# Patient Record
Sex: FEMALE | Race: WHITE | Employment: PART TIME | URBAN - METROPOLITAN AREA
[De-identification: names, ages, dates, MRNs, and addresses within clinical notes are randomized per-mention and may not be internally consistent; named-entity substitution may affect disease eponyms.]

---

## 2021-03-26 ENCOUNTER — OFFICE VISIT (OUTPATIENT)
Dept: FAMILY MEDICINE CLINIC | Age: 26
End: 2021-03-26
Payer: COMMERCIAL

## 2021-03-26 VITALS
HEIGHT: 59 IN | BODY MASS INDEX: 59.07 KG/M2 | WEIGHT: 293 LBS | DIASTOLIC BLOOD PRESSURE: 80 MMHG | OXYGEN SATURATION: 98 % | SYSTOLIC BLOOD PRESSURE: 120 MMHG | HEART RATE: 102 BPM

## 2021-03-26 DIAGNOSIS — H60.502 ACUTE OTITIS EXTERNA OF LEFT EAR, UNSPECIFIED TYPE: Primary | ICD-10-CM

## 2021-03-26 PROCEDURE — G8484 FLU IMMUNIZE NO ADMIN: HCPCS | Performed by: NURSE PRACTITIONER

## 2021-03-26 PROCEDURE — G8417 CALC BMI ABV UP PARAM F/U: HCPCS | Performed by: NURSE PRACTITIONER

## 2021-03-26 PROCEDURE — 4004F PT TOBACCO SCREEN RCVD TLK: CPT | Performed by: NURSE PRACTITIONER

## 2021-03-26 PROCEDURE — 99202 OFFICE O/P NEW SF 15 MIN: CPT | Performed by: NURSE PRACTITIONER

## 2021-03-26 PROCEDURE — 4130F TOPICAL PREP RX AOE: CPT | Performed by: NURSE PRACTITIONER

## 2021-03-26 PROCEDURE — G8427 DOCREV CUR MEDS BY ELIG CLIN: HCPCS | Performed by: NURSE PRACTITIONER

## 2021-03-26 RX ORDER — HYDROXYZINE HYDROCHLORIDE 25 MG/1
25 TABLET, FILM COATED ORAL EVERY 8 HOURS PRN
COMMUNITY
Start: 2021-02-23 | End: 2021-05-24

## 2021-03-26 RX ORDER — SERTRALINE HYDROCHLORIDE 100 MG/1
200 TABLET, FILM COATED ORAL DAILY
COMMUNITY
Start: 2021-02-23 | End: 2021-05-24

## 2021-03-26 RX ORDER — BUPROPION HYDROCHLORIDE 150 MG/1
150 TABLET ORAL DAILY
COMMUNITY
Start: 2021-03-23 | End: 2021-04-22

## 2021-03-26 RX ORDER — NEOMYCIN SULFATE, POLYMYXIN B SULFATE AND HYDROCORTISONE 10; 3.5; 1 MG/ML; MG/ML; [USP'U]/ML
3 SUSPENSION/ DROPS AURICULAR (OTIC) 3 TIMES DAILY
Qty: 1 BOTTLE | Refills: 0 | Status: SHIPPED | OUTPATIENT
Start: 2021-03-26 | End: 2021-04-02

## 2021-03-26 RX ORDER — ARIPIPRAZOLE 5 MG/1
5 TABLET ORAL DAILY
COMMUNITY
Start: 2021-02-23 | End: 2021-05-24

## 2021-03-26 ASSESSMENT — PATIENT HEALTH QUESTIONNAIRE - PHQ9
SUM OF ALL RESPONSES TO PHQ QUESTIONS 1-9: 0
SUM OF ALL RESPONSES TO PHQ9 QUESTIONS 1 & 2: 0
SUM OF ALL RESPONSES TO PHQ QUESTIONS 1-9: 0
2. FEELING DOWN, DEPRESSED OR HOPELESS: 0

## 2021-03-26 ASSESSMENT — ENCOUNTER SYMPTOMS
ABDOMINAL PAIN: 0
SHORTNESS OF BREATH: 0
SORE THROAT: 1
COUGH: 0
RHINORRHEA: 0

## 2021-03-26 NOTE — PATIENT INSTRUCTIONS
Water exclusion from the left ear for one week -> do not submerge your head under water, limit use of in-ear headphones or ear plugs  Ok to use over the counter acetaminophen or ibuprofen according to dose instruction on label as needed for pain  Warm compress applied to external ear may be helpful    To Use the Ear Drops:  Lie down on your side with infected ear facing up. Carefully drip the correct number of eardrops into your ear. Have another person help you if needed. Gently move the outside part of your ear back and forth to help the medicine reach your ear canal.  Stay lying down in the same position (with your ear facing up) for 3 to 5 minutes. Return to office or see PCP if new or worsening symptoms in the next 3 days     Patient Education   Swimmer's Ear: Care Instructions  Your Care Instructions     Swimmer's ear (otitis externa) is inflammation or infection of the ear canal. This is the passage that leads from the outer ear to the eardrum. Any water, sand, or other debris that gets into the ear canal and stays there can cause swimmer's ear. Putting cotton swabs or other items in the ear to clean it can also cause this problem. Swimmer's ear can be very painful. But you can treat the pain and infection with medicines. You should feel better in a few days. Follow-up care is a key part of your treatment and safety. Be sure to make and go to all appointments, and call your doctor if you are having problems. It's also a good idea to know your test results and keep a list of the medicines you take. How can you care for yourself at home? Cleaning and care  · Use antibiotic drops as your doctor directs. · Do not insert ear drops (other than the antibiotic ear drops) or anything else into the ear unless your doctor has told you to. · Avoid getting water in the ear until the problem clears up. Use cotton lightly coated with petroleum jelly as an earplug. Do not use plastic earplugs.   · Use a hair dryer set on low to carefully dry the ear after you shower. · To ease ear pain, hold a warm washcloth against your ear. · Take pain medicines exactly as directed. ? If the doctor gave you a prescription medicine for pain, take it as prescribed. ? If you are not taking a prescription pain medicine, ask your doctor if you can take an over-the-counter medicine. Inserting ear drops  · Warm the drops to body temperature by rolling the container in your hands. Or you can place it in a cup of warm water for a few minutes. · Lie down, with your ear facing up. · Place drops inside the ear. Follow your doctor's instructions (or the directions on the label) for how many drops to use. Gently wiggle the outer ear or pull the ear up and back to help the drops get into the ear. · It's important to keep the liquid in the ear canal for 3 to 5 minutes. When should you call for help? Call your doctor now or seek immediate medical care if:    · You have a new or higher fever.     · You have new or worse pain, swelling, warmth, or redness around or behind your ear.     · You have new or increasing pus or blood draining from your ear. Watch closely for changes in your health, and be sure to contact your doctor if:    · You are not getting better after 2 days (48 hours). Where can you learn more? Go to https://[employer] System[employer].Innohub. org and sign in to your Or[employer]It account. Enter C706 in the [facility] box to learn more about \"Swimmer's Ear: Care Instructions. \"     If you do not have an account, please click on the \"Sign Up Now\" link. Current as of: December 2, 2020               Content Version: 12.8  © 7211-5171 Healthwise, Incorporated. Care instructions adapted under license by [facility] ([facility]). If you have questions about a medical condition or this instruction, always ask your healthcare professional. [address] any warranty or liability for your use of this information.

## 2021-03-26 NOTE — PROGRESS NOTES
3/26/2021    SUBJECTIVE:     Paige Pisano, 22 y.o. female presents today with:  Chief Complaint   Patient presents with    Other     pain in her left ear x 1 day     Otalgia   There is pain in the left ear. This is a new problem. The current episode started yesterday. The problem has been gradually worsening. There has been no fever. The pain is moderate. Associated symptoms include a sore throat (L side). Pertinent negatives include no abdominal pain, coughing, ear discharge, headaches, hearing loss, neck pain, rhinorrhea or vomiting. Associated symptoms comments: denies self-instrumentation trauma or recent swimming. She has tried NSAIDs for the symptoms. The treatment provided mild relief. There is no history of a chronic ear infection or a tympanostomy tube. No past medical history on file. No past surgical history on file. Social History     Tobacco Use    Smoking status: Never Smoker    Smokeless tobacco: Current User   Substance Use Topics    Alcohol use: Yes     Frequency: 2-4 times a month     Drinks per session: 1 or 2     Binge frequency: Never    Drug use: Never     Review of Systems   Constitutional: Negative for chills and fever. HENT: Positive for ear pain and sore throat (L side). Negative for ear discharge, hearing loss, rhinorrhea and tinnitus. Respiratory: Negative for cough and shortness of breath. Gastrointestinal: Negative for abdominal pain, nausea and vomiting. Musculoskeletal: Negative for myalgias and neck pain. Neurological: Negative for dizziness and headaches. OBJECTIVE:     Vitals:    03/26/21 0954   BP: 120/80   Site: Right Upper Arm   Position: Sitting   Cuff Size: Large Adult   Pulse: 102   SpO2: 98%   Weight: 296 lb (134.3 kg)   Height: 4' 11\" (1.499 m)     Physical Exam  Vitals signs reviewed. Constitutional:       General: She is not in acute distress. Appearance: She is well-groomed. HENT:      Head: Normocephalic and atraumatic.       Jaw: There is normal jaw occlusion. Right Ear: Tympanic membrane, ear canal and external ear normal. No swelling or tenderness. No mastoid tenderness. Tympanic membrane is not erythematous. Left Ear: External ear normal. Tenderness (+ EAC inflammation) present. No foreign body. No mastoid tenderness. Tympanic membrane is not injected, erythematous or bulging. Nose: Mucosal edema and congestion present. No nasal tenderness. Right Sinus: No maxillary sinus tenderness. Left Sinus: No maxillary sinus tenderness. Mouth/Throat:      Lips: Pink. No lesions. Mouth: Mucous membranes are moist. No oral lesions. Pharynx: Oropharynx is clear. Uvula midline. No pharyngeal swelling or oropharyngeal exudate. Eyes:      General: Lids are normal.      Extraocular Movements: Extraocular movements intact. Conjunctiva/sclera: Conjunctivae normal.      Pupils: Pupils are equal, round, and reactive to light. Comments: + corrective lenses   Neck:      Musculoskeletal: Normal range of motion and neck supple. Vascular: No JVD. Trachea: Trachea and phonation normal.   Cardiovascular:      Rate and Rhythm: Normal rate and regular rhythm. Heart sounds: No murmur. No friction rub. Pulmonary:      Effort: Pulmonary effort is normal. No tachypnea. Breath sounds: Normal breath sounds and air entry. No wheezing. Lymphadenopathy:      Cervical: No cervical adenopathy. Skin:     General: Skin is warm and dry. Capillary Refill: Capillary refill takes less than 2 seconds. Neurological:      General: No focal deficit present. Mental Status: She is alert. Mental status is at baseline. Gait: Gait is intact. Psychiatric:         Mood and Affect: Mood and affect normal.         Behavior: Behavior normal. Behavior is cooperative. POC Testing Today: No results found for this visit on 03/26/21. ASSESSMENT & PLAN:   22 y.o. female with left otalgia for one day. Exam findings consistent with otitis externa. Anti-infective ear drops prescribed. Reviewed supportive care measures and symptoms that are suggestive of worsening, in which case patient will need to follow-up with her PCP or return to [facility]. Diagnoses and all orders for this visit:    ICD-10-CM    1. Acute otitis externa of left ear, unspecified type  H60.502 neomycin-polymyxin-hydrocortisone (CORTISPORIN) 3.5-67912-4 otic suspension     Orders Placed This Encounter   Medications    neomycin-polymyxin-hydrocortisone (CORTISPORIN) 3.5-65282-7 otic suspension     Sig: Place 3 drops into the left ear 3 times daily for 7 days     Dispense:  1 Bottle     Refill:  0     Symptomatic therapy suggested: use acetaminophen, ibuprofen prn  Warm compresses to external ear prn  Instructed to keep ear dry until symptoms are resolved    Return for follow-up and/or see PCP if new or worsening symptoms in the next 3 days . Side effects and adverse effects of any medication prescribed today, as well as treatment plan/rationale, follow-up care, and result expectations have been discussed with the patient. Saul Sy expresses understanding and wishes to proceed with the plan. Discussed signs and symptoms which require immediate follow-up in ED/call to 911. Understanding verbalized. I have reviewed and updated the electronic medical record.     Papi De León, NICK - CNP

## 2021-03-28 ENCOUNTER — HOSPITAL ENCOUNTER (EMERGENCY)
Age: 26
Discharge: HOME | End: 2021-03-28
Attending: EMERGENCY MEDICINE
Payer: COMMERCIAL

## 2021-03-28 VITALS
BODY MASS INDEX: 59.78 KG/M2 | TEMPERATURE: 97.4 F | WEIGHT: 293 LBS | SYSTOLIC BLOOD PRESSURE: 171 MMHG | OXYGEN SATURATION: 98 % | DIASTOLIC BLOOD PRESSURE: 94 MMHG | HEART RATE: 96 BPM | RESPIRATION RATE: 16 BRPM

## 2021-03-28 DIAGNOSIS — H66.002 ACUTE SUPPURATIVE OTITIS MEDIA OF LEFT EAR WITHOUT SPONTANEOUS RUPTURE OF TYMPANIC MEMBRANE, RECURRENCE NOT SPECIFIED: ICD-10-CM

## 2021-03-28 DIAGNOSIS — H60.392 INFECTIVE OTITIS EXTERNA OF LEFT EAR: Primary | ICD-10-CM

## 2021-03-28 PROCEDURE — 99283 EMERGENCY DEPT VISIT LOW MDM: CPT

## 2021-03-28 PROCEDURE — 6370000000 HC RX 637 (ALT 250 FOR IP): Performed by: EMERGENCY MEDICINE

## 2021-03-28 RX ORDER — ACETAMINOPHEN 500 MG
1000 TABLET ORAL ONCE
Status: COMPLETED | OUTPATIENT
Start: 2021-03-28 | End: 2021-03-28

## 2021-03-28 RX ORDER — TRAMADOL HYDROCHLORIDE 50 MG/1
50 TABLET ORAL EVERY 4 HOURS PRN
Qty: 18 TABLET | Refills: 0 | Status: SHIPPED | OUTPATIENT
Start: 2021-03-28 | End: 2021-03-31

## 2021-03-28 RX ORDER — TRAMADOL HYDROCHLORIDE 50 MG/1
50 TABLET ORAL ONCE
Status: COMPLETED | OUTPATIENT
Start: 2021-03-28 | End: 2021-03-28

## 2021-03-28 RX ORDER — AMOXICILLIN AND CLAVULANATE POTASSIUM 875; 125 MG/1; MG/1
1 TABLET, FILM COATED ORAL ONCE
Status: COMPLETED | OUTPATIENT
Start: 2021-03-28 | End: 2021-03-28

## 2021-03-28 RX ORDER — AMOXICILLIN AND CLAVULANATE POTASSIUM 875; 125 MG/1; MG/1
1 TABLET, FILM COATED ORAL 2 TIMES DAILY
Qty: 20 TABLET | Refills: 0 | Status: SHIPPED | OUTPATIENT
Start: 2021-03-28 | End: 2021-04-07

## 2021-03-28 RX ADMIN — TRAMADOL HYDROCHLORIDE 50 MG: 50 TABLET, FILM COATED ORAL at 17:43

## 2021-03-28 RX ADMIN — AMOXICILLIN AND CLAVULANATE POTASSIUM 1 TABLET: 875; 125 TABLET, FILM COATED ORAL at 17:43

## 2021-03-28 RX ADMIN — ACETAMINOPHEN 1000 MG: 500 TABLET ORAL at 17:43

## 2021-03-28 ASSESSMENT — ENCOUNTER SYMPTOMS
VOMITING: 0
COUGH: 0
SHORTNESS OF BREATH: 0
DIARRHEA: 0
BACK PAIN: 0
NAUSEA: 0
SORE THROAT: 0
ABDOMINAL PAIN: 0

## 2021-03-28 ASSESSMENT — PAIN DESCRIPTION - ORIENTATION: ORIENTATION: LEFT

## 2021-03-28 ASSESSMENT — PAIN SCALES - GENERAL: PAINLEVEL_OUTOF10: 3

## 2021-03-28 NOTE — ED PROVIDER NOTES
[facility] ED  eMERGENCYdEPARTMENT eNCOUnter      Pt Name: Juanpablo Jhaveri  MRN: [id number]  [place] 1995  Date of evaluation: 3/28/2021  Slime Mejía MD    CHIEF COMPLAINT           HPI  Juanpablo Jhaveri is a 22 y.o. female per chart review has no pmh presents to the ED with L ear pain. Pt notes gradual onset, moderate, intermittent, throbbing, L ear pain since Friday. Pt went to urgent care Friday and was diagnosed with otitis externa and given ear drops that have not helped. Pt denies fever, smoking, swimming, DM II, cp, sob, dysuria, diarrhea. ROS  Review of Systems   Constitutional: Negative for activity change, chills and fever. HENT: Positive for ear pain. Negative for sore throat. Eyes: Negative for visual disturbance. Respiratory: Negative for cough and shortness of breath. Cardiovascular: Negative for chest pain, palpitations and leg swelling. Gastrointestinal: Negative for abdominal pain, diarrhea, nausea and vomiting. Genitourinary: Negative for dysuria. Musculoskeletal: Negative for back pain. Skin: Negative for rash. Neurological: Negative for dizziness and weakness. Except as noted above the remainder of the review of systems was reviewed and negative. PAST MEDICAL HISTORY   History reviewed. No pertinent past medical history. SURGICAL HISTORY     History reviewed. No pertinent surgical history.       CURRENTMEDICATIONS       Previous Medications    ARIPIPRAZOLE (ABILIFY) 5 MG TABLET    Take 5 mg by mouth daily    BUPROPION (WELLBUTRIN XL) 150 MG EXTENDED RELEASE TABLET    Take 150 mg by mouth daily    HYDROXYZINE (ATARAX) 25 MG TABLET    Take 25 mg by mouth every 8 hours as needed    NEOMYCIN-POLYMYXIN-HYDROCORTISONE (CORTISPORIN) 3.5-44926-4 OTIC SUSPENSION    Place 3 drops into the left ear 3 times daily for 7 days    SERTRALINE (ZOLOFT) 100 MG TABLET    Take 200 mg by mouth daily       ALLERGIES Oxycodone-acetaminophen    FAMILY HISTORY     History reviewed. No pertinent family history. SOCIAL HISTORY       Social History     Socioeconomic History    Marital status: Single     Spouse name: None    Number of children: None    Years of education: None    Highest education level: None   Occupational History    None   Social Needs    Financial resource strain: None    Food insecurity     Worry: None     Inability: None    Transportation needs     Medical: None     Non-medical: None   Tobacco Use    Smoking status: Never Smoker    Smokeless tobacco: Current User   Substance and Sexual Activity    Alcohol use: Yes     Frequency: 2-4 times a month     Drinks per session: 1 or 2     Binge frequency: Never    Drug use: Never    Sexual activity: None   Lifestyle    Physical activity     Days per week: None     Minutes per session: None    Stress: None   Relationships    Social connections     Talks on phone: None     Gets together: None     Attends Moravian service: None     Active member of club or organization: None     Attends meetings of clubs or organizations: None     Relationship status: None    Intimate partner violence     Fear of current or ex partner: None     Emotionally abused: None     Physically abused: None     Forced sexual activity: None   Other Topics Concern    None   Social History Narrative    None         PHYSICAL EXAM       ED Triage Vitals [03/28/21 1737]   BP Temp Temp Source Pulse Resp SpO2 Height Weight   (!) 171/94 97.4 °F (36.3 °C) Oral 96 16 98 % -- 296 lb (134.3 kg)       Physical Exam  Vitals signs and nursing note reviewed. Constitutional:       Appearance: She is well-developed. HENT:      Head: Normocephalic. Right Ear: External ear normal.      Left Ear: External ear normal.      Ears:      Comments: R TM ear canal unremarkable. R TM opaque, good landmarks. L TM opaque, erythematic, poor landmarks.   +Purulence in ear canal.   Eyes: Conjunctiva/sclera: Conjunctivae normal.      Pupils: Pupils are equal, round, and reactive to light. Neck:      Musculoskeletal: Normal range of motion and neck supple. Cardiovascular:      Rate and Rhythm: Normal rate and regular rhythm. Heart sounds: Normal heart sounds. Pulmonary:      Effort: Pulmonary effort is normal.      Breath sounds: Normal breath sounds. Abdominal:      General: Bowel sounds are normal. There is no distension. Palpations: Abdomen is soft. Tenderness: There is no abdominal tenderness. Musculoskeletal: Normal range of motion. Skin:     General: Skin is warm and dry. Neurological:      Mental Status: She is alert and oriented to person, place, and time. Psychiatric:         Mood and Affect: Mood normal.           MDM  21 yo female presents to the ED with ear pain. Pt is afebrile, hemodynamically stable. Pt clinically with otitis externa and otitis media. Unsure of etiology of otitis externa given no smoking, DM II, swimming. Given that drops have not helped resolve pt's infx, will add oral abx. Pt given PO tramadol, PO tylenol in the ED. Pt also given PO augmentin in the ED. Pt given prescription for tramadol, augmentin. Pt will f/u with pcp. Pt understands plan. FINAL IMPRESSION      1. Infective otitis externa of left ear    2.  Acute suppurative otitis media of left ear without spontaneous rupture of tympanic membrane, recurrence not specified          DISPOSITION/PLAN   DISPOSITION Decision To Discharge 03/28/2021 05:44:46 PM        DISCHARGE MEDICATIONS:  [unfilled]         Brando Stafford MD(electronically signed)  Attending Emergency Physician            Brando Stafford MD  03/28/21 0080

## 2021-04-16 ASSESSMENT — ENCOUNTER SYMPTOMS
NAUSEA: 0
VOMITING: 0

## 2021-12-19 ENCOUNTER — HOSPITAL ENCOUNTER (EMERGENCY)
Age: 26
Discharge: HOME | End: 2021-12-20
Attending: EMERGENCY MEDICINE
Payer: COMMERCIAL

## 2021-12-19 ENCOUNTER — APPOINTMENT (OUTPATIENT)
Dept: CT IMAGING | Age: 26
End: 2021-12-19
Payer: COMMERCIAL

## 2021-12-19 DIAGNOSIS — N12 PYELONEPHRITIS: ICD-10-CM

## 2021-12-19 DIAGNOSIS — N30.01 ACUTE CYSTITIS WITH HEMATURIA: Primary | ICD-10-CM

## 2021-12-19 LAB
ALBUMIN SERPL-MCNC: 4.5 G/DL (ref 3.5–4.6)
ALP BLD-CCNC: 59 U/L (ref 40–130)
ALT SERPL-CCNC: 20 U/L (ref 0–33)
ANION GAP SERPL CALCULATED.3IONS-SCNC: 16 MEQ/L (ref 9–15)
AST SERPL-CCNC: 18 U/L (ref 0–35)
BACTERIA: NEGATIVE /HPF
BASOPHILS ABSOLUTE: 0.1 K/UL (ref 0–0.2)
BASOPHILS RELATIVE PERCENT: 0.6 %
BILIRUB SERPL-MCNC: 0.5 MG/DL (ref 0.2–0.7)
BILIRUBIN URINE: NEGATIVE
BLOOD, URINE: ABNORMAL
BUN BLDV-MCNC: 20 MG/DL (ref 6–20)
CALCIUM SERPL-MCNC: 9.6 MG/DL (ref 8.5–9.9)
CHLORIDE BLD-SCNC: 100 MEQ/L (ref 95–107)
CLARITY: ABNORMAL
CO2: 22 MEQ/L (ref 20–31)
COLOR: YELLOW
CREAT SERPL-MCNC: 1.08 MG/DL (ref 0.5–0.9)
EOSINOPHILS ABSOLUTE: 0.1 K/UL (ref 0–0.7)
EOSINOPHILS RELATIVE PERCENT: 1.6 %
EPITHELIAL CELLS, UA: ABNORMAL /HPF (ref 0–5)
GFR AFRICAN AMERICAN: >60
GFR NON-AFRICAN AMERICAN: >60
GLOBULIN: 3.2 G/DL (ref 2.3–3.5)
GLUCOSE BLD-MCNC: 114 MG/DL (ref 70–99)
GLUCOSE URINE: NEGATIVE MG/DL
HCG, URINE, POC: NEGATIVE
HCT VFR BLD CALC: 44.3 % (ref 37–47)
HEMOGLOBIN: 14.9 G/DL (ref 12–16)
HYALINE CASTS: ABNORMAL /HPF (ref 0–5)
KETONES, URINE: ABNORMAL MG/DL
LEUKOCYTE ESTERASE, URINE: ABNORMAL
LYMPHOCYTES ABSOLUTE: 1.6 K/UL (ref 1–4.8)
LYMPHOCYTES RELATIVE PERCENT: 18.9 %
Lab: NORMAL
MCH RBC QN AUTO: 29.2 PG (ref 27–31.3)
MCHC RBC AUTO-ENTMCNC: 33.6 % (ref 33–37)
MCV RBC AUTO: 86.8 FL (ref 82–100)
MONOCYTES ABSOLUTE: 0.8 K/UL (ref 0.2–0.8)
MONOCYTES RELATIVE PERCENT: 8.7 %
NEGATIVE QC PASS/FAIL: NORMAL
NEUTROPHILS ABSOLUTE: 6.1 K/UL (ref 1.4–6.5)
NEUTROPHILS RELATIVE PERCENT: 70.2 %
NITRITE, URINE: NEGATIVE
PDW BLD-RTO: 12.9 % (ref 11.5–14.5)
PH UA: 5 (ref 5–9)
PLATELET # BLD: 350 K/UL (ref 130–400)
POSITIVE QC PASS/FAIL: NORMAL
POTASSIUM SERPL-SCNC: 3.9 MEQ/L (ref 3.4–4.9)
PROTEIN UA: 30 MG/DL
RBC # BLD: 5.1 M/UL (ref 4.2–5.4)
RBC UA: ABNORMAL /HPF (ref 0–5)
SODIUM BLD-SCNC: 138 MEQ/L (ref 135–144)
SPECIFIC GRAVITY UA: 1.02 (ref 1–1.03)
TOTAL PROTEIN: 7.7 G/DL (ref 6.3–8)
URINE REFLEX TO CULTURE: ABNORMAL
UROBILINOGEN, URINE: 0.2 E.U./DL
WBC # BLD: 8.7 K/UL (ref 4.8–10.8)
WBC UA: ABNORMAL /HPF (ref 0–5)

## 2021-12-19 PROCEDURE — 96374 THER/PROPH/DIAG INJ IV PUSH: CPT

## 2021-12-19 PROCEDURE — 6360000002 HC RX W HCPCS: Performed by: EMERGENCY MEDICINE

## 2021-12-19 PROCEDURE — 80053 COMPREHEN METABOLIC PANEL: CPT

## 2021-12-19 PROCEDURE — 99283 EMERGENCY DEPT VISIT LOW MDM: CPT

## 2021-12-19 PROCEDURE — 85025 COMPLETE CBC W/AUTO DIFF WBC: CPT

## 2021-12-19 PROCEDURE — 36415 COLL VENOUS BLD VENIPUNCTURE: CPT

## 2021-12-19 PROCEDURE — 74176 CT ABD & PELVIS W/O CONTRAST: CPT

## 2021-12-19 PROCEDURE — 81001 URINALYSIS AUTO W/SCOPE: CPT

## 2021-12-19 RX ORDER — KETOROLAC TROMETHAMINE 15 MG/ML
30 INJECTION, SOLUTION INTRAMUSCULAR; INTRAVENOUS ONCE
Status: COMPLETED | OUTPATIENT
Start: 2021-12-19 | End: 2021-12-19

## 2021-12-19 RX ADMIN — KETOROLAC TROMETHAMINE 30 MG: 15 INJECTION, SOLUTION INTRAMUSCULAR; INTRAVENOUS at 23:25

## 2021-12-19 ASSESSMENT — ENCOUNTER SYMPTOMS
ABDOMINAL PAIN: 1
NAUSEA: 1
VOMITING: 1

## 2021-12-19 ASSESSMENT — PAIN SCALES - GENERAL
PAINLEVEL_OUTOF10: 4
PAINLEVEL_OUTOF10: 6

## 2021-12-19 ASSESSMENT — PAIN DESCRIPTION - ORIENTATION: ORIENTATION: RIGHT;LOWER

## 2021-12-19 ASSESSMENT — PAIN DESCRIPTION - LOCATION: LOCATION: ABDOMEN

## 2021-12-19 ASSESSMENT — PAIN DESCRIPTION - DESCRIPTORS: DESCRIPTORS: STABBING

## 2021-12-19 ASSESSMENT — PAIN DESCRIPTION - PAIN TYPE: TYPE: ACUTE PAIN

## 2021-12-20 VITALS
TEMPERATURE: 98 F | DIASTOLIC BLOOD PRESSURE: 78 MMHG | BODY MASS INDEX: 59.07 KG/M2 | OXYGEN SATURATION: 98 % | HEIGHT: 59 IN | WEIGHT: 293 LBS | SYSTOLIC BLOOD PRESSURE: 135 MMHG | RESPIRATION RATE: 20 BRPM | HEART RATE: 99 BPM

## 2021-12-20 PROCEDURE — 6370000000 HC RX 637 (ALT 250 FOR IP): Performed by: EMERGENCY MEDICINE

## 2021-12-20 RX ORDER — SULFAMETHOXAZOLE AND TRIMETHOPRIM 800; 160 MG/1; MG/1
1 TABLET ORAL 2 TIMES DAILY
Qty: 28 TABLET | Refills: 0 | Status: SHIPPED | OUTPATIENT
Start: 2021-12-20 | End: 2022-01-03

## 2021-12-20 RX ORDER — SULFAMETHOXAZOLE AND TRIMETHOPRIM 800; 160 MG/1; MG/1
1 TABLET ORAL ONCE
Status: COMPLETED | OUTPATIENT
Start: 2021-12-20 | End: 2021-12-20

## 2021-12-20 RX ADMIN — SULFAMETHOXAZOLE AND TRIMETHOPRIM 1 TABLET: 800; 160 TABLET ORAL at 00:42

## 2021-12-20 ASSESSMENT — PAIN DESCRIPTION - PROGRESSION: CLINICAL_PROGRESSION: GRADUALLY IMPROVING

## 2021-12-20 NOTE — ED PROVIDER NOTES
[facility] ED  EMERGENCY MEDICINE     Pt Name: Seven Sanchez  MRN: [id number]  [place] 1995  Date of evaluation: 12/19/2021  PCP:    Nevin Erwin  Provider: Bony Hall, [address] [place] [address]       Chief Complaint   Patient presents with    Abdominal Pain     right sided lower abd pain        HISTORY OF PRESENT ILLNESS    HPI     70-year-old female with history of PCOS and past cholecystectomy presents to the emergency department with complaint of right lower abdominal pain radiating to the back. No history of kidney stones. No dysuria or hematuria. Denies fevers or chills. Did take 200 mg of Motrin before coming in at around 6 PM with minimal relief. States her pain is a 5 out of 10 at this time. States that she did have nausea and one episode of vomiting. Triage notes and Nursing notes were reviewed by myself. Any discrepancies are addressed above. PAST MEDICAL HISTORY     Past Medical History:   Diagnosis Date    PCOS (polycystic ovarian syndrome)        SURGICAL HISTORY       Past Surgical History:   Procedure Laterality Date    CHOLECYSTECTOMY         CURRENT MEDICATIONS       Previous Medications    ARIPIPRAZOLE (ABILIFY) 5 MG TABLET    Take 5 mg by mouth daily    BUPROPION (WELLBUTRIN XL) 150 MG EXTENDED RELEASE TABLET    Take 150 mg by mouth daily    SERTRALINE (ZOLOFT) 100 MG TABLET    Take 200 mg by mouth daily       ALLERGIES       Allergies   Allergen Reactions    Oxycodone-Acetaminophen Shortness Of Breath     Visual changes and nausea       FAMILY HISTORY     History reviewed. No pertinent family history.      SOCIAL HISTORY       Social History     Socioeconomic History    Marital status: Single     Spouse name: None    Number of children: None    Years of education: None    Highest education level: None   Occupational History    None   Tobacco Use    Smoking status: Never Smoker    Smokeless tobacco: Current User   Substance and Sexual Activity  Alcohol use: Yes     Comment: socially    Drug use: Never    Sexual activity: None   Other Topics Concern    None   Social History Narrative    None     Social Determinants of Health     Financial Resource Strain:     Difficulty of Paying Living Expenses: Not on file   Food Insecurity:     Worried About Running Out of Food in the Last Year: Not on file    [place] of Food in the Last Year: Not on file   Transportation Needs:     Lack of Transportation (Medical): Not on file    Lack of Transportation (Non-Medical): Not on file   Physical Activity:     Days of Exercise per Week: Not on file    Minutes of Exercise per Session: Not on file   Stress:     Feeling of Stress : Not on file   Social Connections:     Frequency of Communication with Friends and Family: Not on file    Frequency of Social Gatherings with Friends and Family: Not on file    Attends Buddhist Services: Not on file    Active Member of [address] [place] [address] [employer] or Organizations: Not on file    Attends Club or Organization Meetings: Not on file    Marital Status: Not on file   Intimate Partner Violence:     Fear of Current or Ex-Partner: Not on file    Emotionally Abused: Not on file    Physically Abused: Not on file    Sexually Abused: Not on file   Housing Stability:     Unable to Pay for Housing in the Last Year: Not on file    Number of [place] in the Last Year: Not on file    Unstable Housing in the Last Year: Not on file       REVIEW OF SYSTEMS     Review of Systems   Gastrointestinal: Positive for abdominal pain, nausea and vomiting. Genitourinary: Positive for flank pain. Except as noted above the remainder of the review of systems was reviewed and is negative.   SCREENINGS                        PHYSICAL EXAM    (up to 7 for level 4, 8 or more for level 5)     ED Triage Vitals [12/19/21 2136]   BP Temp Temp Source Pulse Resp SpO2 Height Weight   (!) 193/108 98 °F (36.7 °C) Oral 109 20 96 % 4' 11\" (1.499 m) 300 lb (136.1 kg) Physical Exam  Constitutional:       General: She is not in acute distress. Appearance: Normal appearance. She is obese. HENT:      Right Ear: External ear normal.      Left Ear: External ear normal.      Nose: Nose normal. No congestion or rhinorrhea. Mouth/Throat:      Mouth: Mucous membranes are moist.      Pharynx: Oropharynx is clear. Eyes:      General:         Right eye: No discharge. Left eye: No discharge. Conjunctiva/sclera: Conjunctivae normal.      Pupils: Pupils are equal, round, and reactive to light. Cardiovascular:      Rate and Rhythm: Normal rate and regular rhythm. Pulmonary:      Effort: Pulmonary effort is normal. No respiratory distress. Abdominal:      General: Abdomen is flat. There is no distension. Tenderness: There is no abdominal tenderness (No reproducible tenderness on exam). Musculoskeletal:         General: Normal range of motion. Cervical back: Normal range of motion. Skin:     General: Skin is warm and dry. Capillary Refill: Capillary refill takes less than 2 seconds. Neurological:      General: No focal deficit present. Mental Status: She is alert. DIAGNOSTIC RESULTS     EKG:(none if blank)  All EKGs are interpreted by the Emergency Department Physician who either signs or Co-signs this chart in the absence of a cardiologist.        RADIOLOGY: (none if blank)   I directly visualized the following images and reviewed the radiologist interpretations. Interpretation per the Radiologist below, if available at the time of this note:  CT ABDOMEN PELVIS WO CONTRAST Additional Contrast? None   Final Result     There is mild right hydronephrosis without hydroureter or visible    ureteral calculus. Consider ascending urinary tract infection or    pyelonephritis. Unremarkable bowel gas pattern.   No acute inflammatory process is seen    involving the bowel          LABS:  Labs Reviewed   COMPREHENSIVE METABOLIC PANEL - Abnormal; Notable for the following components:       Result Value    Anion Gap 16 (*)     Glucose 114 (*)     CREATININE 1.08 (*)     All other components within normal limits   URINE RT REFLEX TO CULTURE - Abnormal; Notable for the following components:    Clarity, UA TURBID (*)     Ketones, Urine TRACE (*)     Blood, Urine LARGE (*)     Protein, UA 30 (*)     Leukocyte Esterase, Urine SMALL (*)     All other components within normal limits   MICROSCOPIC URINALYSIS - Abnormal; Notable for the following components:    WBC, UA 6-9 (*)     RBC, UA 20-50 (*)     All other components within normal limits   POC PREGNANCY UR-QUAL - Normal   CBC WITH AUTO DIFFERENTIAL       All other labs were within normal range or not returned as of this dictation. Please note, any cultures that may have been sent were not resulted at the time of this patient visit. EMERGENCY DEPARTMENT COURSE and Medical Decision Making:     Vitals:    Vitals:    12/19/21 2136   BP: (!) 193/108   Pulse: 109   Resp: 20   Temp: 98 °F (36.7 °C)   TempSrc: Oral   SpO2: 96%   Weight: 300 lb (136.1 kg)   Height: 4' 11\" (1.499 m)       PROCEDURES: (None if blank)  Procedures       MDM     Patient does have evidence for urinary tract infection and possible pyelonephritis on CT scan. Patient's vitals are otherwise stable and she is afebrile. Kidneys function is normal.  Will be given a dose of Bactrim before discharge. Toradol did help with her pain significantly. Strict return precautions and follow up instructions were discussed with the patient with which the patient agrees    ED Medications administered this visit:    Medications   sulfamethoxazole-trimethoprim (BACTRIM DS;SEPTRA DS) 800-160 MG per tablet 1 tablet (has no administration in time range)   ketorolac (TORADOL) injection 30 mg (30 mg IntraVENous Given 12/19/21 0595)         FINAL IMPRESSION      1. Acute cystitis with hematuria    2.  Pyelonephritis          DISPOSITION/PLAN DISPOSITION Decision To Discharge 12/20/2021 12:09:19 AM      PATIENT REFERRED TO:  Sheri Marker  [address] [id number] [phone]            DISCHARGE MEDICATIONS:  New Prescriptions    SULFAMETHOXAZOLE-TRIMETHOPRIM (BACTRIM DS;SEPTRA DS) 800-160 MG PER TABLET    Take 1 tablet by mouth 2 times daily for 14 days              Suni Lopes DO (electronically signed)  Attending Physician, Emergency Department          Suni Lopes DO  12/20/21 0013

## 2021-12-20 NOTE — ED TRIAGE NOTES
Patient presents to the er with complaints of lower right abdominal pain that goes around to lower back   Denies UTI symptoms  Denies history of kidney stones but states she has family history of   Complains of nausea without emesis   Denies fever

## 2023-07-13 ENCOUNTER — APPOINTMENT (OUTPATIENT)
Dept: GENERAL RADIOLOGY | Age: 28
End: 2023-07-13
Payer: COMMERCIAL

## 2023-07-13 ENCOUNTER — HOSPITAL ENCOUNTER (EMERGENCY)
Age: 28
Discharge: HOME | End: 2023-07-13
Payer: COMMERCIAL

## 2023-07-13 VITALS
RESPIRATION RATE: 16 BRPM | TEMPERATURE: 97.9 F | DIASTOLIC BLOOD PRESSURE: 66 MMHG | WEIGHT: 293 LBS | BODY MASS INDEX: 63.21 KG/M2 | HEART RATE: 84 BPM | SYSTOLIC BLOOD PRESSURE: 91 MMHG | OXYGEN SATURATION: 95 % | HEIGHT: 57 IN

## 2023-07-13 DIAGNOSIS — R07.89 CHEST WALL PAIN: Primary | ICD-10-CM

## 2023-07-13 PROCEDURE — 71046 X-RAY EXAM CHEST 2 VIEWS: CPT

## 2023-07-13 PROCEDURE — 99284 EMERGENCY DEPT VISIT MOD MDM: CPT

## 2023-07-13 PROCEDURE — 93005 ELECTROCARDIOGRAM TRACING: CPT | Performed by: PHYSICIAN ASSISTANT

## 2023-07-13 RX ORDER — ATORVASTATIN CALCIUM 10 MG/1
TABLET, FILM COATED ORAL
COMMUNITY
Start: 2022-07-06

## 2023-07-13 RX ORDER — HYDROXYZINE HYDROCHLORIDE 10 MG/1
TABLET, FILM COATED ORAL
COMMUNITY

## 2023-07-13 RX ORDER — NAPROXEN 500 MG/1
500 TABLET ORAL 2 TIMES DAILY
Qty: 10 TABLET | Refills: 0 | Status: SHIPPED | OUTPATIENT
Start: 2023-07-13 | End: 2023-07-18

## 2023-07-13 ASSESSMENT — ENCOUNTER SYMPTOMS
COLOR CHANGE: 0
CONSTIPATION: 0
RHINORRHEA: 0
ABDOMINAL DISTENTION: 0
SHORTNESS OF BREATH: 0
EYE DISCHARGE: 0
ABDOMINAL PAIN: 0
SORE THROAT: 0

## 2023-07-13 ASSESSMENT — PAIN - FUNCTIONAL ASSESSMENT
PAIN_FUNCTIONAL_ASSESSMENT: NONE - DENIES PAIN
PAIN_FUNCTIONAL_ASSESSMENT: NONE - DENIES PAIN

## 2023-07-13 NOTE — ED PROVIDER NOTES
[facility] ED  eMERGENCY dEPARTMENT eNCOUnter      Pt Name: Grabiel Miner  MRN: [id number]  [address] 1995  Date of evaluation: 7/13/2023  Provider: Tripp Yan PA-C    CHIEF COMPLAINT       Chief Complaint   Patient presents with    Chest Pain         HISTORY OF PRESENT ILLNESS   (Location/Symptom, Timing/Onset,Context/Setting, Quality, Duration, Modifying Factors, Severity)  Note limiting factors. Grabiel Miner is a 32 y.o. female who presents to the emergency department midsternal chest pain which patient states started around 6:30 AM this morning, she states it was very brief, in the left chest, lasting about 30 seconds in duration, and then resolved, there is been no recurrence since that time patient denies any nausea vomit, no shortness of breath, no diaphoresis. She denies any acute injury, or change in physical activity. .  Past medical history significant for PCOS. HPI    NursingNotes were reviewed. REVIEW OF SYSTEMS    (2-9 systems for level 4, 10 or more for level 5)     Review of Systems   Constitutional:  Negative for activity change and appetite change. HENT:  Negative for congestion, ear discharge, ear pain, nosebleeds, rhinorrhea and sore throat. Eyes:  Negative for discharge. Respiratory:  Negative for shortness of breath. Cardiovascular:  Positive for chest pain. Negative for palpitations and leg swelling. Gastrointestinal:  Negative for abdominal distention, abdominal pain and constipation. Genitourinary:  Negative for difficulty urinating and dysuria. Musculoskeletal:  Negative for arthralgias. Skin:  Negative for color change. Neurological:  Negative for dizziness, syncope, numbness and headaches. Psychiatric/Behavioral:  Negative for agitation and confusion. Except as noted above the remainder of the review of systems was reviewed and negative.        PAST MEDICAL HISTORY     Past Medical History:   Diagnosis Date    PCOS

## 2023-07-13 NOTE — ED TRIAGE NOTES
Pt states waking up at 0645 with sharp left side chest pain that lasted 30 seconds and have since resolved. Pt is A&OX4, skin intact, afebrile, breaths are equal and unlabored.

## 2023-07-13 NOTE — ED NOTES
Discharge instructions reviewed with patient. Medications reviewed and explained to patient. Patient denies any further questions at this time. Pt encouraged to make follow up appointments with PCP and any speciality referrals.         Dagoberto Leonard RN  07/13/23 9472

## 2023-07-14 LAB
EKG ATRIAL RATE: 90 BPM
EKG P AXIS: 21 DEGREES
EKG P-R INTERVAL: 126 MS
EKG Q-T INTERVAL: 382 MS
EKG QRS DURATION: 76 MS
EKG QTC CALCULATION (BAZETT): 467 MS
EKG R AXIS: 32 DEGREES
EKG T AXIS: 30 DEGREES
EKG VENTRICULAR RATE: 90 BPM

## 2023-07-23 ENCOUNTER — HOSPITAL ENCOUNTER (INPATIENT)
Age: 28
DRG: 751 | End: 2023-07-23
Attending: PSYCHIATRY & NEUROLOGY | Admitting: PSYCHIATRY & NEUROLOGY
Payer: COMMERCIAL

## 2023-07-23 DIAGNOSIS — F32.9 MAJOR DEPRESSIVE DISORDER, REMISSION STATUS UNSPECIFIED, UNSPECIFIED WHETHER RECURRENT: Primary | ICD-10-CM

## 2023-07-23 DIAGNOSIS — R45.851 SUICIDAL IDEATION: ICD-10-CM

## 2023-07-23 LAB
ALBUMIN SERPL-MCNC: 4.6 G/DL (ref 3.5–4.6)
ALP SERPL-CCNC: 70 U/L (ref 40–130)
ALT SERPL-CCNC: 18 U/L (ref 0–33)
AMPHET UR QL SCN: NORMAL
ANION GAP SERPL CALCULATED.3IONS-SCNC: 15 MEQ/L (ref 9–15)
APAP SERPL-MCNC: <5 UG/ML (ref 10–30)
AST SERPL-CCNC: 20 U/L (ref 0–35)
BACTERIA URNS QL MICRO: NEGATIVE /HPF
BARBITURATES UR QL SCN: NORMAL
BASOPHILS # BLD: 0.1 K/UL (ref 0–0.2)
BASOPHILS NFR BLD: 0.4 %
BENZODIAZ UR QL SCN: NORMAL
BILIRUB SERPL-MCNC: 0.7 MG/DL (ref 0.2–0.7)
BILIRUB UR QL STRIP: NEGATIVE
BUN SERPL-MCNC: 19 MG/DL (ref 6–20)
CALCIUM SERPL-MCNC: 9.7 MG/DL (ref 8.5–9.9)
CANNABINOIDS UR QL SCN: NORMAL
CHLORIDE SERPL-SCNC: 99 MEQ/L (ref 95–107)
CHOLEST SERPL-MCNC: 220 MG/DL (ref 0–199)
CK SERPL-CCNC: 91 U/L (ref 0–170)
CLARITY UR: CLEAR
CO2 SERPL-SCNC: 25 MEQ/L (ref 20–31)
COCAINE UR QL SCN: NORMAL
COLOR UR: YELLOW
CREAT SERPL-MCNC: 0.74 MG/DL (ref 0.5–0.9)
CRYSTALS URNS MICRO: ABNORMAL /HPF
DRUG SCREEN COMMENT UR-IMP: NORMAL
EOSINOPHIL # BLD: 0.1 K/UL (ref 0–0.7)
EOSINOPHIL NFR BLD: 1 %
EPI CELLS #/AREA URNS AUTO: ABNORMAL /HPF (ref 0–5)
ERYTHROCYTE [DISTWIDTH] IN BLOOD BY AUTOMATED COUNT: 13 % (ref 11.5–14.5)
ETHANOL PERCENT: NORMAL G/DL
ETHANOLAMINE SERPL-MCNC: <10 MG/DL (ref 0–0.08)
FENTANYL SCREEN, URINE: NORMAL
GLOBULIN SER CALC-MCNC: 2.8 G/DL (ref 2.3–3.5)
GLUCOSE SERPL-MCNC: 101 MG/DL (ref 70–99)
GLUCOSE UR STRIP-MCNC: NEGATIVE MG/DL
GONADOTROPIN, CHORIONIC (HCG) QUANT: <0.1 MIU/ML
HCT VFR BLD AUTO: 43.8 % (ref 37–47)
HDLC SERPL-MCNC: 62 MG/DL (ref 40–59)
HGB BLD-MCNC: 15 G/DL (ref 12–16)
HGB UR QL STRIP: NEGATIVE
HYALINE CASTS #/AREA URNS AUTO: ABNORMAL /HPF (ref 0–5)
KETONES UR STRIP-MCNC: NEGATIVE MG/DL
LDLC SERPL CALC-MCNC: 126 MG/DL (ref 0–129)
LEUKOCYTE ESTERASE UR QL STRIP: ABNORMAL
LYMPHOCYTES # BLD: 3.1 K/UL (ref 1–4.8)
LYMPHOCYTES NFR BLD: 24.4 %
MCH RBC QN AUTO: 30.3 PG (ref 27–31.3)
MCHC RBC AUTO-ENTMCNC: 34.3 % (ref 33–37)
MCV RBC AUTO: 88.2 FL (ref 79.4–94.8)
METHADONE UR QL SCN: NORMAL
MONOCYTES # BLD: 0.7 K/UL (ref 0.2–0.8)
MONOCYTES NFR BLD: 5.1 %
NEUTROPHILS # BLD: 8.9 K/UL (ref 1.4–6.5)
NEUTS SEG NFR BLD: 69.1 %
NITRITE UR QL STRIP: NEGATIVE
OPIATES UR QL SCN: NORMAL
OXYCODONE UR QL SCN: NORMAL
PCP UR QL SCN: NORMAL
PH UR STRIP: 5 [PH] (ref 5–9)
PLATELET # BLD AUTO: 389 K/UL (ref 130–400)
POTASSIUM SERPL-SCNC: 3.8 MEQ/L (ref 3.4–4.9)
PROPOXYPH UR QL SCN: NORMAL
PROT SERPL-MCNC: 7.4 G/DL (ref 6.3–8)
PROT UR STRIP-MCNC: NEGATIVE MG/DL
RBC # BLD AUTO: 4.96 M/UL (ref 4.2–5.4)
RBC #/AREA URNS AUTO: ABNORMAL /HPF (ref 0–5)
SALICYLATES SERPL-MCNC: <0.3 MG/DL (ref 15–30)
SODIUM SERPL-SCNC: 139 MEQ/L (ref 135–144)
SP GR UR STRIP: 1.03 (ref 1–1.03)
TRIGL SERPL-MCNC: 158 MG/DL (ref 0–150)
TSH SERPL-MCNC: 5.02 UIU/ML (ref 0.44–3.86)
URINE REFLEX TO CULTURE: ABNORMAL
UROBILINOGEN UR STRIP-ACNC: 0.2 E.U./DL
WBC # BLD AUTO: 12.8 K/UL (ref 4.8–10.8)
WBC #/AREA URNS AUTO: ABNORMAL /HPF (ref 0–5)

## 2023-07-23 PROCEDURE — 85025 COMPLETE CBC W/AUTO DIFF WBC: CPT

## 2023-07-23 PROCEDURE — 81001 URINALYSIS AUTO W/SCOPE: CPT

## 2023-07-23 PROCEDURE — 84702 CHORIONIC GONADOTROPIN TEST: CPT

## 2023-07-23 PROCEDURE — 99285 EMERGENCY DEPT VISIT HI MDM: CPT

## 2023-07-23 PROCEDURE — 83036 HEMOGLOBIN GLYCOSYLATED A1C: CPT

## 2023-07-23 PROCEDURE — 80143 DRUG ASSAY ACETAMINOPHEN: CPT

## 2023-07-23 PROCEDURE — 84439 ASSAY OF FREE THYROXINE: CPT

## 2023-07-23 PROCEDURE — 36415 COLL VENOUS BLD VENIPUNCTURE: CPT

## 2023-07-23 PROCEDURE — 80053 COMPREHEN METABOLIC PANEL: CPT

## 2023-07-23 PROCEDURE — 80061 LIPID PANEL: CPT

## 2023-07-23 PROCEDURE — 84443 ASSAY THYROID STIM HORMONE: CPT

## 2023-07-23 PROCEDURE — 82550 ASSAY OF CK (CPK): CPT

## 2023-07-23 PROCEDURE — 80307 DRUG TEST PRSMV CHEM ANLYZR: CPT

## 2023-07-23 PROCEDURE — 80179 DRUG ASSAY SALICYLATE: CPT

## 2023-07-23 PROCEDURE — 82077 ASSAY SPEC XCP UR&BREATH IA: CPT

## 2023-07-23 ASSESSMENT — ENCOUNTER SYMPTOMS
COLOR CHANGE: 0
TROUBLE SWALLOWING: 0
APNEA: 0
ALLERGIC/IMMUNOLOGIC NEGATIVE: 1
SHORTNESS OF BREATH: 0
ABDOMINAL PAIN: 0
EYE PAIN: 0

## 2023-07-23 ASSESSMENT — LIFESTYLE VARIABLES
HOW OFTEN DO YOU HAVE A DRINK CONTAINING ALCOHOL: NEVER
HOW MANY STANDARD DRINKS CONTAINING ALCOHOL DO YOU HAVE ON A TYPICAL DAY: PATIENT DECLINED

## 2023-07-23 NOTE — ED NOTES
Pt is in bed area quiet and cooperative with even respirations no problems      Snow Álvarez, ADRIAN  07/23/23 3703

## 2023-07-23 NOTE — ED NOTES
Pt is in the bed area quiet and cooperative with no problems and resting.   Pt needs urine sent to the lab and she is aware for the need of the urine     Miroslava Herrera RN  07/23/23 4835

## 2023-07-23 NOTE — ED PROVIDER NOTES
[address] [facility]  EMERGENCY DEPARTMENT ENCOUNTER      Pt Name: Zacarias Abbott  MRN: [id number]  [address] [facility] 1995  Date of evaluation: 7/23/2023  Provider: Krystina Powers PA-C    CHIEF COMPLAINT       Chief Complaint   Patient presents with    Suicidal     Pt states having suicidal ideation denies having a plan. HISTORY OF PRESENT ILLNESS   (Location/Symptom, Timing/Onset, Context/Setting, Quality, Duration, Modifying Factors, Severity)  Note limiting factors. Zacarias Abbott is a 32 y.o. female who presents to the emergency department with a 1 week history of depression with suicidal ideation without plan. Patient denies any homicidal ideation, auditory or visual hallucinations. Patient does state that she is on medicines for depression and takes them \"when I remember to\". Patient recently seen in the emergency department 1 and half weeks ago for chest pain but states that that subsequently resolved and denies any other recent illnesses or injuries    HPI    Nursing Notes were reviewed. REVIEW OF SYSTEMS    (2-9 systems for level 4, 10 or more for level 5)     Review of Systems   Constitutional:  Negative for diaphoresis and fever. HENT:  Negative for hearing loss and trouble swallowing. Eyes:  Negative for pain. Respiratory:  Negative for apnea and shortness of breath. Cardiovascular:  Negative for chest pain. Gastrointestinal:  Negative for abdominal pain. Endocrine: Negative. Genitourinary:  Negative for hematuria. Musculoskeletal:  Negative for neck pain and neck stiffness. Skin:  Negative for color change. Allergic/Immunologic: Negative. Neurological:  Negative for dizziness and numbness. Hematological: Negative. Psychiatric/Behavioral:  Positive for suicidal ideas. All other systems reviewed and are negative. Except as noted above the remainder of the review of systems was reviewed and negative.        PAST MEDICAL HISTORY     Past Medical History:

## 2023-07-24 PROBLEM — F33.9 MAJOR DEPRESSION, RECURRENT (HCC): Status: ACTIVE | Noted: 2023-07-24

## 2023-07-24 LAB
HBA1C MFR BLD: 5.1 % (ref 4.8–5.9)
T4 FREE SERPL-MCNC: 1.4 NG/DL (ref 0.84–1.68)

## 2023-07-24 PROCEDURE — 6370000000 HC RX 637 (ALT 250 FOR IP): Performed by: NURSE PRACTITIONER

## 2023-07-24 PROCEDURE — 1240000000 HC EMOTIONAL WELLNESS R&B

## 2023-07-24 PROCEDURE — 6370000000 HC RX 637 (ALT 250 FOR IP): Performed by: PSYCHIATRY & NEUROLOGY

## 2023-07-24 RX ORDER — ATORVASTATIN CALCIUM 10 MG/1
10 TABLET, FILM COATED ORAL NIGHTLY
Status: DISCONTINUED | OUTPATIENT
Start: 2023-07-24 | End: 2023-07-31 | Stop reason: HOSPADM

## 2023-07-24 RX ORDER — HALOPERIDOL 5 MG/ML
5 INJECTION INTRAMUSCULAR EVERY 6 HOURS PRN
Status: DISCONTINUED | OUTPATIENT
Start: 2023-07-24 | End: 2023-07-31 | Stop reason: HOSPADM

## 2023-07-24 RX ORDER — HYDROXYZINE PAMOATE 50 MG/1
50 CAPSULE ORAL EVERY 6 HOURS PRN
Status: DISCONTINUED | OUTPATIENT
Start: 2023-07-24 | End: 2023-07-31 | Stop reason: HOSPADM

## 2023-07-24 RX ORDER — TRAZODONE HYDROCHLORIDE 50 MG/1
50 TABLET ORAL NIGHTLY PRN
Status: DISCONTINUED | OUTPATIENT
Start: 2023-07-24 | End: 2023-07-31 | Stop reason: HOSPADM

## 2023-07-24 RX ORDER — IBUPROFEN 400 MG/1
400 TABLET ORAL EVERY 6 HOURS PRN
Status: DISCONTINUED | OUTPATIENT
Start: 2023-07-24 | End: 2023-07-31 | Stop reason: HOSPADM

## 2023-07-24 RX ORDER — HALOPERIDOL 5 MG/1
5 TABLET ORAL EVERY 6 HOURS PRN
Status: DISCONTINUED | OUTPATIENT
Start: 2023-07-24 | End: 2023-07-31 | Stop reason: HOSPADM

## 2023-07-24 RX ORDER — MAGNESIUM HYDROXIDE/ALUMINUM HYDROXICE/SIMETHICONE 120; 1200; 1200 MG/30ML; MG/30ML; MG/30ML
30 SUSPENSION ORAL PRN
Status: DISCONTINUED | OUTPATIENT
Start: 2023-07-24 | End: 2023-07-31 | Stop reason: HOSPADM

## 2023-07-24 RX ORDER — BENZTROPINE MESYLATE 1 MG/ML
2 INJECTION INTRAMUSCULAR; INTRAVENOUS 2 TIMES DAILY PRN
Status: DISCONTINUED | OUTPATIENT
Start: 2023-07-24 | End: 2023-07-31 | Stop reason: HOSPADM

## 2023-07-24 RX ORDER — HYDROXYZINE HYDROCHLORIDE 50 MG/ML
50 INJECTION, SOLUTION INTRAMUSCULAR EVERY 6 HOURS PRN
Status: DISCONTINUED | OUTPATIENT
Start: 2023-07-24 | End: 2023-07-31 | Stop reason: HOSPADM

## 2023-07-24 RX ADMIN — ATORVASTATIN CALCIUM 10 MG: 10 TABLET, FILM COATED ORAL at 21:09

## 2023-07-24 RX ADMIN — HYDROXYZINE PAMOATE 50 MG: 50 CAPSULE ORAL at 15:51

## 2023-07-24 ASSESSMENT — SLEEP AND FATIGUE QUESTIONNAIRES
DO YOU HAVE DIFFICULTY SLEEPING: YES
DO YOU USE A SLEEP AID: NO
SLEEP PATTERN: DISTURBED/INTERRUPTED SLEEP;DIFFICULTY FALLING ASLEEP
AVERAGE NUMBER OF SLEEP HOURS: 7

## 2023-07-24 ASSESSMENT — PATIENT HEALTH QUESTIONNAIRE - PHQ9: SUM OF ALL RESPONSES TO PHQ QUESTIONS 1-9: 11

## 2023-07-24 NOTE — ED NOTES
Patient resting quietly. Respirations are even and unlabored. No distress noted at this time.       Alissa Lee RN  07/24/23 1965

## 2023-07-24 NOTE — ED NOTES
Patient in bed resting. Respirations even and unlabored. Safety precautions maintained.         Phillip Cabello RN  07/24/23 5031
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
## 2023-07-24 NOTE — ED NOTES
Patient works at [employer] on [address]. Called her employer to notify them she will miss work. Ate 100% of breakfast. Calm and cooperative.  Waiting for a bed to open on [address] [place] [address]  07/24/23 3491

## 2023-07-24 NOTE — ED NOTES
Report given at this time to [facility]. [employer] called at this time.  Melissa Solomon RN updated that pt has CPAP listed but that she has not used since arriving to ED and does not have at hospital.      Kinga Lucio RN  07/24/23 [address] [place], [place]  07/24/23 9168

## 2023-07-24 NOTE — ED NOTES
Patient resting quietly. Respirations are even and unlabored. No distress noted at this time.       Lolly Mueller RN  07/24/23 2410

## 2023-07-24 NOTE — FLOWSHEET NOTE
Pt reports that she came to the hospital because she is really overwhelmed with everything and last night she was having suicidal thoughts. Pt has sad, flat affect. Pt has fair eye contact through interview. Wears glasses. Pt reports that anxiety is 7/10 and was medicated with prn vistaril after interview. Pt reports depression 7/10. Pt states that she began feeling paranoid because work was so overwhelming. She began to have HI towards coworkers and family members. These thoughts were last month, denies recent HI. Pt denies SI/AVH. Pt signed consents. Oriented to unit policy and procedures.

## 2023-07-24 NOTE — CONSULTS
MEDICAL HISTORY AND PHYSICAL             Date: 7/24/2023        Patient Name: Nava Contreras     YOB: 1995      Age:  32 y.o. Chief Complaint     Chief Complaint   Patient presents with    Suicidal     Pt states having suicidal ideation denies having a plan. History Obtained From   patient, electronic medical record    History of Present Illness   Kathya Shrestha is a [id number]  female with significant past medical history of depression, PCOS, HARRISON, obesity, who was admitted for worsening depression and suicidal ideation without a plan. According to the patient, she is feeling overwhelmed with her work. 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 At the time of examination, claims depression but denies suicidal and homicidal ideation as well as any presence of visual, auditory, and tactile hallucinations. She also denies headaches, dizziness, shortness of breath, chest pain, and N/V/D. Past Medical History     Past Medical History:   Diagnosis Date    PCOS (polycystic ovarian syndrome)       Past Surgical History     Past Surgical History:   Procedure Laterality Date    CHOLECYSTECTOMY        Medications Prior to Admission     Prior to Admission medications    Medication Sig Start Date End Date Taking? Authorizing Provider   vitamin D 25 MCG (1000 UT) CAPS Take 2 capsules by mouth daily 9/9/22   Historical Provider, MD   atorvastatin (LIPITOR) 10 MG tablet  7/6/22   Historical Provider, MD   hydrOXYzine HCl (ATARAX) 10 MG tablet     Historical Provider, MD   naproxen (NAPROSYN) 500 MG tablet Take 1 tablet by mouth 2 times daily for 5 days 7/13/23 7/18/23  Blue Vora PA-C   ARIPiprazole (ABILIFY) 5 MG tablet Take 1 tablet by mouth daily 2/23/21 5/24/21  Rendell Sprinkles. NICK Encarnacion CNP   buPROPion (WELLBUTRIN XL) 150 MG extended release tablet Take 2 tablets by mouth daily 3/23/21 4/22/21  Rendell Sprinkles.  NICK Encarnacion CNP   sertraline (ZOLOFT) 100 MG tablet Take 2 tablets by

## 2023-07-24 NOTE — ED NOTES
Patient in bed resting. Respirations even and unlabored. Safety precautions maintained.         Lyly Chowdhury RN  07/23/23 2520

## 2023-07-24 NOTE — ED NOTES
Patient resting quietly. Respirations are even and unlabored. No distress noted at this time.       Shira Harvey RN  07/24/23 0558

## 2023-07-24 NOTE — ED NOTES
Patient is pleasant and cooperative. Awake. Requested a snack and to watch TV.       Naida Mallory RN  07/23/23 9482

## 2023-07-24 NOTE — GROUP NOTE
Group Therapy Note    Date: 7/24/2023    Group Start Time: 1600  Group End Time: 36  Group Topic: Healthy Living/Wellness    [facility] 3W I    Alice Medina RN        Group Therapy Note    Attendees: 7/21       Patient's Goal:  learn about sleep hygiene    Notes:      Status After Intervention:  Unchanged    Participation Level:  Active Listener    Participation Quality: Appropriate      Speech:  normal      Thought Process/Content: Logical      Affective Functioning: Congruent      Mood: euthymic      Level of consciousness:  Alert      Response to Learning: Progressing to goal      Endings: None Reported    Modes of Intervention: Education      Discipline Responsible: Registered Nurse      Signature:  Alice Medina RN

## 2023-07-24 NOTE — ED NOTES
Provisional Diagnosis:       Major Depressive Disorder    Psychosocial and Contextual Factors:       Stress at work. C-SSRS Summary:      C-SSRS Suicide Screening 1) Within the past month, have you wished you were dead or wished you could go to sleep and not wake up? : Yes  2) Have you actually had any thoughts of killing yourself? : Yes  3) Have you been thinking about how you might kill yourself? : No  4) Have you had these thoughts and had some intention of acting on them? : No  5) Have you started to work out or worked out the details of how to kill yourself? Do you intend to carry out this plan? : No  6) Have you ever done anything, started to do anything, or prepared to do anything to end your life?: No  Risk of Suicide: Low Risk       Substance Abuse: Denies    Present Suicidal Behavior:      C-SSRS Suicide Frequent Screening 2) Since you were last asked, have you actually had thoughts about killing yourself? : No  6) Since you were last asked, have you done anything, started to do anything, or prepared to do anything to end your life?: No  Risk of Suicide: No Risk     Past Suicidal Behavior:     Verbal: Denies     Attempt:Denies       Self-Injurious/Self-Mutilation: Denies       Violence Current or Past: Denies       Trauma Identified: Denies     Protective Factors:      Did not endorse any protective factors      Risk Factors:      Stress at work         Clinical Summary:      Per Barrera Roa PA-C:    Artur Bolden is a 32 y.o. female who presents to the emergency department with a 1 week history of depression with suicidal ideation without plan. Patient denies any homicidal ideation, auditory or visual hallucinations. Patient does state that she is on medicines for depression and takes them \"when I remember to\". Patient recently seen in the emergency department 1 and half weeks ago for chest pain but states that that subsequently resolved and denies any other recent illnesses or injuries.

## 2023-07-24 NOTE — ED NOTES
Patient in bed resting. Respirations even and unlabored. Safety precautions maintained.         Micaela Lorenzo RN  07/24/23 5421

## 2023-07-24 NOTE — ED NOTES
Patient in bed resting. Respirations even and unlabored. Safety precautions maintained.         Fermin Simpson RN  07/24/23 2393

## 2023-07-24 NOTE — FLOWSHEET NOTE
Pt arrived to 3W from [address] [place] [address] ER via w/ch accompanied by two staff. Pt able to ambulate to room. Pt oriented to room, unit. Skin check completed by this RN and Darius Foster RN. No skin issues noted at this time. No contraband noted. Pt spoke with dietary for meals. Toiletries in room.
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
## 2023-07-24 NOTE — ED NOTES
Spoke to Dr Jaziel Raza for admission orders. Will admit when a bed on [facility] is available.       Ivonne Muller RN  07/24/23 7846

## 2023-07-24 NOTE — ED NOTES
Patient in bed resting. Respirations even and unlabored. Safety precautions maintained.         Mariely Glynn RN  07/24/23 0004

## 2023-07-25 PROCEDURE — 1240000000 HC EMOTIONAL WELLNESS R&B

## 2023-07-25 PROCEDURE — 6370000000 HC RX 637 (ALT 250 FOR IP): Performed by: NURSE PRACTITIONER

## 2023-07-25 PROCEDURE — 6370000000 HC RX 637 (ALT 250 FOR IP): Performed by: PSYCHIATRY & NEUROLOGY

## 2023-07-25 PROCEDURE — 99223 1ST HOSP IP/OBS HIGH 75: CPT | Performed by: PSYCHIATRY & NEUROLOGY

## 2023-07-25 RX ORDER — VENLAFAXINE HYDROCHLORIDE 37.5 MG/1
37.5 CAPSULE, EXTENDED RELEASE ORAL
Status: DISCONTINUED | OUTPATIENT
Start: 2023-07-26 | End: 2023-07-31 | Stop reason: HOSPADM

## 2023-07-25 RX ADMIN — IBUPROFEN 400 MG: 400 TABLET, FILM COATED ORAL at 20:10

## 2023-07-25 RX ADMIN — ATORVASTATIN CALCIUM 10 MG: 10 TABLET, FILM COATED ORAL at 20:50

## 2023-07-25 RX ADMIN — CARIPRAZINE 1.5 MG: 1.5 CAPSULE, GELATIN COATED ORAL at 10:45

## 2023-07-25 ASSESSMENT — PAIN DESCRIPTION - LOCATION: LOCATION: KNEE

## 2023-07-25 ASSESSMENT — PAIN SCALES - GENERAL: PAINLEVEL_OUTOF10: 4

## 2023-07-25 ASSESSMENT — PAIN DESCRIPTION - ORIENTATION: ORIENTATION: RIGHT

## 2023-07-25 NOTE — PROGRESS NOTES
Patient rated her anxiety a #3/10 and denied depression. Pt denied SI/HI/AVH and contracts for safety on the unit. Pt reports appetite in normal range, poor sleep, and going to groups. Pt is seen out on the unit minimally social and isolates to self.

## 2023-07-25 NOTE — GROUP NOTE
Group Therapy Note    Date: 7/24/2023    Group Start Time: 1945  Group End Time: 2015  Group Topic: Wrap-Up    [facility] 3[facility]    Chaparro Parham RN; Kyra Redding Mayito    To attend wrap up group and state whether or not goal was met. Group Therapy Note    Attendees: 11/22       Patient's Goal:  No goal.    Notes:  Pt reports she had a decent day today. Pt able to relax and read book. Status After Intervention:  Unchanged    Participation Level:  Active Listener and Interactive    Participation Quality: Appropriate, Attentive, and Sharing      Speech:  normal      Thought Process/Content: Logical      Affective Functioning: Congruent      Mood: euthymic      Level of consciousness:  Alert and Oriented x4      Response to Learning: Capable of insight      Endings: None Reported    Modes of Intervention: Education and Support      Discipline Responsible: Registered Nurse      Signature:  Chaparro Parham RN

## 2023-07-25 NOTE — PROGRESS NOTES
Pt rated her depression a #1/10 and her anxiety a #2/10. Pt denied SI/HI/AVH and contracts for safety on the unit. Pt reports showering today, appetite in normal range, and increased sleep. Pt had flat affect throughout assessment. Pt isolates to her room and goes to groups.

## 2023-07-25 NOTE — PROGRESS NOTES
Behavioral Services  Medicare Certification Upon Admission    I certify that this patient's inpatient psychiatric hospital admission is medically necessary for:    [x] (1) Treatment which could reasonably be expected to improve this patient's condition,       [x] (2) Or for diagnostic study;     AND     [x](2) The inpatient psychiatric services are provided while the individual is under the care of a physician and are included in the individualized plan of care.     Estimated length of stay/service 3-5    Plan for post-hospital care Op care    Electronically signed by Russell Homans, MD on 7/25/2023 at 9:58 AM

## 2023-07-25 NOTE — GROUP NOTE
Group Therapy Note    Date: 7/25/2023    Group Start Time: 1000  Group End Time: 1050  Group Topic: Recreational    [facility] 3[facility]    Meka Quan        Group Therapy Note    Attendees: 11       Patient's Goal:  To attend group    Notes:  Pt was attentive     Status After Intervention:  Unchanged    Participation Level: Interactive    Participation Quality: Appropriate      Speech:  normal      Thought Process/Content: Logical      Affective Functioning: Congruent      Mood: euthymic      Level of consciousness:  Alert      Response to Learning: Able to verbalize current knowledge/experience      Endings: None Reported    Modes of Intervention: Activity      Discipline Responsible: Behavorial Health Tech      Signature:   Mikael Juarez

## 2023-07-25 NOTE — GROUP NOTE
Group Therapy Note    Date: 7/24/2023    Group Start Time: 2100  Group End Time: 2130  Group Topic: Recreational    [facility] 3W JOANN Burden RN; Scott Edmond    To attend recreation group playing Wii Sports with peers. Group Therapy Note    Attendees: 2/22           Notes:  Pt did not attend group despite encouragement from staff.      Signature:  Arlyn Burden RN

## 2023-07-25 NOTE — GROUP NOTE
Group Therapy Note    Date: 2023    Group Start Time:   Group End Time:   Group Topic: Wrap-Up    [facility] 3W LEIF Fisher RN; Linda Carolina Mayito    To attend wrap up group and state whether or not goal was met.      Group Therapy Note    Attendees:            Patient's Goal:  ***    Notes:  ***    Status After Intervention:  {Status After Intervention:113324273}    Participation Level: {Participation Level:737269752}    Participation Quality: {[facility] PARTICIPATION QUALITY:288295874}      Speech:  {[facility] CD_SPEECH:74345}      Thought Process/Content: {Thought Process/Content:112063953}      Affective Functioning: {Affective Functionin}      Mood: {Mood:652014617}      Level of consciousness:  {Level of consciousness:519974580}      Response to Learnin [facility] Responses to Learnin}      Endings: {[facility] Endings:32371}    Modes of Intervention: {[facility]HI Modes of Intervention:382660249}      Discipline Responsible: [facility]5 [facility] Multidisciplinary:086190121}      Signature:  Lynette Fisher RN

## 2023-07-25 NOTE — PLAN OF CARE
Problem: Risk for Elopement  Goal: Patient will not exit the unit/facility without proper excort  Outcome: Progressing     Problem: Anxiety  Goal: Will report anxiety at manageable levels  Description: INTERVENTIONS:  1. Administer medication as ordered  2. Teach and rehearse alternative coping skills  3. Provide emotional support with 1:1 interaction with staff  Outcome: Progressing     Problem: Depression/Self Harm  Goal: Effect of psychiatric condition will be minimized and patient will be protected from self harm  Description: INTERVENTIONS:  1. Assess impact of patient's symptoms on level of functioning, self care needs and offer support as indicated  2. Assess patient/family knowledge of depression, impact on illness and need for teaching  3. Provide emotional support, presence and reassurance  4. Assess for possible suicidal thoughts or ideation. If patient expresses suicidal thoughts or statements do not leave alone, initiate Suicide Precautions, move to a room close to the nursing station and obtain sitter  5.  Initiate consults as appropriate with Mental Health Professional, Spiritual Care, Psychosocial CNS, and consider a recommendation to the LIP for a Psychiatric Consultation  Outcome: Progressing

## 2023-07-26 PROCEDURE — 6370000000 HC RX 637 (ALT 250 FOR IP): Performed by: PSYCHIATRY & NEUROLOGY

## 2023-07-26 PROCEDURE — 99232 SBSQ HOSP IP/OBS MODERATE 35: CPT | Performed by: PSYCHIATRY & NEUROLOGY

## 2023-07-26 PROCEDURE — 1240000000 HC EMOTIONAL WELLNESS R&B

## 2023-07-26 PROCEDURE — 6370000000 HC RX 637 (ALT 250 FOR IP): Performed by: NURSE PRACTITIONER

## 2023-07-26 RX ORDER — PRAZOSIN HYDROCHLORIDE 1 MG/1
1 CAPSULE ORAL NIGHTLY
Status: DISCONTINUED | OUTPATIENT
Start: 2023-07-26 | End: 2023-07-31 | Stop reason: HOSPADM

## 2023-07-26 RX ADMIN — ATORVASTATIN CALCIUM 10 MG: 10 TABLET, FILM COATED ORAL at 21:05

## 2023-07-26 RX ADMIN — VENLAFAXINE HYDROCHLORIDE 37.5 MG: 37.5 CAPSULE, EXTENDED RELEASE ORAL at 10:05

## 2023-07-26 RX ADMIN — IBUPROFEN 400 MG: 400 TABLET, FILM COATED ORAL at 20:08

## 2023-07-26 RX ADMIN — PRAZOSIN HYDROCHLORIDE 1 MG: 1 CAPSULE ORAL at 21:05

## 2023-07-26 RX ADMIN — CARIPRAZINE 1.5 MG: 1.5 CAPSULE, GELATIN COATED ORAL at 10:05

## 2023-07-26 ASSESSMENT — PAIN SCALES - GENERAL: PAINLEVEL_OUTOF10: 4

## 2023-07-26 ASSESSMENT — PAIN DESCRIPTION - DESCRIPTORS: DESCRIPTORS: ACHING;DISCOMFORT

## 2023-07-26 ASSESSMENT — PAIN DESCRIPTION - LOCATION: LOCATION: KNEE

## 2023-07-26 NOTE — CARE COORDINATION
FAMILY COLLATERAL NOTE    Family/Support Name: Selvin Osborn #: [phone]  Relationship to Pt[de-identified] Dad        Family/Support contact aware of hospitalization: Yes  Presenting Symptoms/Current Concerns:  Depression, overwhelmed, med compliance    Top 3 Life Stressors:   Physical health issues  Car broke down  Feeling like a failure      Background History Relevant to Current Hospitalization:  Patient has been feeling very overwhelmed. She's not good about taking her medications. Dad noticed her mood was changing. Pt was getting vertigo and mid sternal pain. Her car broke down and needs to be fixed so she's trying to save money for that. 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 She is feeling like a failure and disappointment to everyone as she doesn't know what to do with her life. Patient has no previous admissions but has been in therapy for 10 years. Pt has never voiced SI to dad. Dad used to worry about pt having SI, but doesn't anymore as pt is very honest with him about her feelings. Dad wondering if vertigo is a medication side effect or if it needs to be looked into more. Family Mental Health/Substance Use History:   No history that is known    Support Network's Goal for Hospitalization:   Be open and honest, focus more on herself and not everyone else's expectations, take better care of herself.   Discharge Plan:   Return home - lives with dad    Support Network Supportive of Discharge Plan:   Yes    Support can confirm Safety of Location and Security of Weapons:   No weapons in the home    Support agreeable to Safeguard and Monitor Medications (including Prescription and OTC):   Agreeable   Identified Barriers to Compliance with Discharge Plan:   Medication compliance, no transportation  Recommendations for Support Network:   Be available for follow up calls      [employer]

## 2023-07-26 NOTE — GROUP NOTE
Group Therapy Note    Date: 7/26/2023    Group Start Time: 5567  Group End Time: 2444  Group Topic: Healthy Living/Wellness    [facility]    Blayne Santos RN; Eber Thorne RN        Group Therapy Note    Attendees: 11/22       Patient's Goal:  To participate in group     Notes:  Mental Health- warning signs    Status After Intervention:  Unchanged    Participation Level: Interactive    Participation Quality: Appropriate      Speech:  normal      Thought Process/Content: Logical      Affective Functioning: Congruent      Mood: euthymic      Level of consciousness:  Alert and Oriented x4      Response to Learning: Progressing to goal      Endings: None Reported    Modes of Intervention: Education, Support, and Socialization      Discipline Responsible: Registered Nurse      Signature:  Blayne Santos RN

## 2023-07-26 NOTE — PLAN OF CARE
Problem: Risk for Elopement  Goal: Patient will not exit the unit/facility without proper excort  7/25/2023 2350 by Katie Melvin RN  Outcome: Progressing  7/25/2023 1041 by Virgen Graham RN  Outcome: Progressing     Problem: Anxiety  Goal: Will report anxiety at manageable levels  Description: INTERVENTIONS:  1. Administer medication as ordered  2. Teach and rehearse alternative coping skills  3. Provide emotional support with 1:1 interaction with staff  7/25/2023 2350 by Katie Melvin RN  Outcome: Progressing  7/25/2023 1041 by Virgen Graham RN  Outcome: Progressing     Problem: Depression/Self Harm  Goal: Effect of psychiatric condition will be minimized and patient will be protected from self harm  Description: INTERVENTIONS:  1. Assess impact of patient's symptoms on level of functioning, self care needs and offer support as indicated  2. Assess patient/family knowledge of depression, impact on illness and need for teaching  3. Provide emotional support, presence and reassurance  4. Assess for possible suicidal thoughts or ideation. If patient expresses suicidal thoughts or statements do not leave alone, initiate Suicide Precautions, move to a room close to the nursing station and obtain sitter  5.  Initiate consults as appropriate with Mental Health Professional, Spiritual Care, Psychosocial CNS, and consider a recommendation to the LIP for a Psychiatric Consultation  7/25/2023 2350 by Katie Melvin RN  Outcome: Progressing  7/25/2023 1041 by Virgen Graham RN  Outcome: Progressing

## 2023-07-26 NOTE — PROGRESS NOTES
Morning Community Meeting Topics    Fanny Attila attended the morning community meeting on 7/26/23. Topics discussed today     [x] Introduction  Day of the week and date  Mask distribution  Current mask requirements  [x]Teams  Explanation of  Green and Blue team criteria  Nurses assigned to each team for today  Explanation about green and blue paper  Date  Patient's Name  Patient's Nurse  Goals  [x] Visitation  Announce the visiting hours for the day  Announce which team is allowed to have visitors for the day  Review any updated Covid 19 requirements for visitors during visitation  Vaccine Card or negative Covid test within 48 hours of visit  State Identification  Patients are reminded to alert the  at least 1 hour before visitation   [x] Unit Orientation  Coffee use  Phone location and etiquette  Shower locations  Franco and dryer location and process  Common area expectations  Staff rounds expectation  [x] Meals   Educate patient to the menu  The patient is encouraged to fill out the menu to get preferences at mealtime  The patient is educated that if they do not fill out the menu, they will get the standard tray  The coffee pot is decaf, patient encouraged to order regular coffee from menu.   Educate patient to the meal process  Patient encouraged to eat snacks provided twice daily  Snacks may stay in patient room     [x] Discharge Process  Discharge expectations  Fill out the survey after discharge   [x] Hygiene  Daily showers encouraged  Showers availability discussed   Daily dressing encouraged  Discussed wearing street clothing  Education provided on where to place linens and clothing  Linens in the hamper  personal clothing does not go into the linen hamper  [x] Group   Patient encouraged to attend group provided  Time of Group Meetings discussed  Gentle reminder that attendance is a Physician order  [x] Movement  Chair exercises completed  Stretching completed  Notes:

## 2023-07-26 NOTE — GROUP NOTE
Group Therapy Note    Date: 7/26/2023    Group Start Time: 4897  Group End Time: 1025  Group Topic: Healthy Living/Wellness    [facility]    Blair Linton RN; Lyssa Oliveros RN        Group Therapy Note    Attendees: 12/21       Patient's Goal:  To participate in group    Notes:   Active participant     Status After Intervention:  Unchanged    Participation Level: Interactive    Participation Quality: Appropriate      Speech:  normal      Thought Process/Content: Logical      Affective Functioning: Congruent      Mood: euthymic      Level of consciousness:  Alert and Oriented x4      Response to Learning: Progressing to goal      Endings: None Reported    Modes of Intervention: Education, Support, and Socialization      Discipline Responsible: Registered Nurse      Signature:  Blair Linton RN

## 2023-07-26 NOTE — GROUP NOTE
Group Therapy Note    Date: 7/25/2023    Group Start Time: 1945  Group End Time: 2015  Group Topic: Wrap-Up    [facility]    Roslyn Negrete RN    To attend wrap up group and state whether or not goal was met. Group Therapy Note    Attendees: 20/21       Patient's Goal:  To make it through the day. Notes:  Goal met. Status After Intervention:  Improved    Participation Level:  Active Listener and Interactive    Participation Quality: Appropriate and Attentive      Speech:  normal      Thought Process/Content: Logical      Affective Functioning: Congruent      Mood: euthymic      Level of consciousness:  Alert and Oriented x4      Response to Learning: Capable of insight      Endings: None Reported    Modes of Intervention: Support and Socialization      Discipline Responsible: Registered Nurse      Signature:  Roslyn Negrete RN

## 2023-07-26 NOTE — PROGRESS NOTES
[address] [place] FOLLOW-UP NOTE       7/26/2023     Patient was seen and examined in person, Chart reviewed   Patient's case discussed with staff/team    Chief Complaint: Depression    Interim History:     Pt report no big change in her presentation  Still feel depressed and anxious  Sleep better last night  Hopeless and worthless feeling  Passive SI ++  Appetite:   [] Normal/Unchanged  [] Increased  [x] Decreased      Sleep:       [] Normal/Unchanged  [x] Fair       [] Poor              Energy:    [] Normal/Unchanged  [] Increased  [x] Decreased        SI [x] Present  [] Absent    HI  []Present  [x] Absent     Aggression:  [] yes  [x] no    Patient is [x] able  [] unable to CONTRACT FOR SAFETY     PAST MEDICAL/PSYCHIATRIC HISTORY:   Past Medical History:   Diagnosis Date    PCOS (polycystic ovarian syndrome)        FAMILY/SOCIAL HISTORY:  History reviewed. No pertinent family history. Social History     Socioeconomic History    Marital status: Single     Spouse name: Not on file    Number of children: Not on file    Years of education: Not on file    Highest education level: Not on file   Occupational History    Not on file   Tobacco Use    Smoking status: Never    Smokeless tobacco: Current   Substance and Sexual Activity    Alcohol use: Yes     Comment: socially    Drug use: Never    Sexual activity: Not on file   Other Topics Concern    Not on file   Social History Narrative    Not on file     Social Determinants of Health     Financial Resource Strain: Not on file   Food Insecurity: Not on file   Transportation Needs: Not on file   Physical Activity: Not on file   Stress: Not on file   Social Connections: Not on file   Intimate Partner Violence: Not on file   Housing Stability: Not on file           ROS:  [x] All negative/unchanged except if checked.  Explain positive(checked items) below:  [] Constitutional  [] Eyes  [] Ear/Nose/Mouth/Throat  [] Respiratory  [] CV  [] stay      Electronically signed by Xavier Barkley MD on 7/26/2023 at 4:14 PM

## 2023-07-27 PROCEDURE — 6370000000 HC RX 637 (ALT 250 FOR IP): Performed by: NURSE PRACTITIONER

## 2023-07-27 PROCEDURE — 99232 SBSQ HOSP IP/OBS MODERATE 35: CPT | Performed by: PSYCHIATRY & NEUROLOGY

## 2023-07-27 PROCEDURE — 6370000000 HC RX 637 (ALT 250 FOR IP): Performed by: PSYCHIATRY & NEUROLOGY

## 2023-07-27 PROCEDURE — 1240000000 HC EMOTIONAL WELLNESS R&B

## 2023-07-27 RX ADMIN — VENLAFAXINE HYDROCHLORIDE 37.5 MG: 37.5 CAPSULE, EXTENDED RELEASE ORAL at 10:48

## 2023-07-27 RX ADMIN — PRAZOSIN HYDROCHLORIDE 1 MG: 1 CAPSULE ORAL at 21:33

## 2023-07-27 RX ADMIN — ATORVASTATIN CALCIUM 10 MG: 10 TABLET, FILM COATED ORAL at 21:33

## 2023-07-27 RX ADMIN — CARIPRAZINE 1.5 MG: 1.5 CAPSULE, GELATIN COATED ORAL at 10:48

## 2023-07-27 NOTE — PROGRESS NOTES
[address] [place] FOLLOW-UP NOTE       7/27/2023     Patient was seen and examined in person, Chart reviewed   Patient's case discussed with staff/team    Chief Complaint: depression SI    Interim History:     Depression and anxiety improved, \"I feel much better today, I have a fear of failure. 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 No pertinent family history.   Social History     Socioeconomic History    Marital status: Single     Spouse name: Not on file    Number of children: Not on file    Years of education: Not on file    Highest education level: Not on file   Occupational History    Not on file   Tobacco Use    Smoking status: Never    Smokeless tobacco: Current   Substance and Sexual Activity    Alcohol use: Yes     Comment: socially    Drug use: Never    Sexual activity: Not on file   Other Topics Concern    Not on file   Social History Narrative    Not on file     Social Determinants of Health     Financial Resource Strain: Not on file   Food Insecurity: Not on file   Transportation Needs: Not on file   Physical Activity: Not on file   Stress: Not on file   Social Connections: Not on file   Intimate Partner Violence: Not on file   Housing Stability: Not on file

## 2023-07-27 NOTE — PROGRESS NOTES
Morning Community Meeting Topics    Baudilio Maurice attended the morning community meeting on 7/27/23. Topics discussed today     [x] Introduction  Day of the week and date  Mask distribution  Current mask requirements  [x]Teams  Explanation of  Green and Blue team criteria  Nurses assigned to each team for today  Explanation about green and blue paper  Date  Patient's Name  Patient's Nurse  Goals  [x] Visitation  Announce the visiting hours for the day  Announce which team is allowed to have visitors for the day  Review any updated Covid 19 requirements for visitors during visitation  Vaccine Card or negative Covid test within 48 hours of visit  State Identification  Patients are reminded to alert the  at least 1 hour before visitation   [x] Unit Orientation  Coffee use  Phone location and etiquette  Shower locations  Franco and dryer location and process  Common area expectations  Staff rounds expectation  [x] Meals   Educate patient to the menu  The patient is encouraged to fill out the menu to get preferences at mealtime  The patient is educated that if they do not fill out the menu, they will get the standard tray  The coffee pot is decaf, patient encouraged to order regular coffee from menu.   Educate patient to the meal process  Patient encouraged to eat snacks provided twice daily  Snacks may stay in patient room     [x] Discharge Process  Discharge expectations  Fill out the survey after discharge   [x] Hygiene  Daily showers encouraged  Showers availability discussed   Daily dressing encouraged  Discussed wearing street clothing  Education provided on where to place linens and clothing  Linens in the hamper  personal clothing does not go into the linen hamper  [x] Group   Patient encouraged to attend group provided  Time of Group Meetings discussed  Gentle reminder that attendance is a Physician order  [x] Movement  Chair exercises completed  Stretching completed  Notes:  Electronically

## 2023-07-27 NOTE — PLAN OF CARE
Problem: Risk for Elopement  Goal: Patient will not exit the unit/facility without proper excort  7/26/2023 2235 by Zeina Buckner RN  Outcome: Progressing  7/26/2023 1143 by Saloni Avery RN  Outcome: Progressing  Flowsheets (Taken 7/26/2023 1137)  Nursing Interventions for Elopement Risk:   Reduce environmental triggers   Communicate to physician the risk for elopement     Problem: Anxiety  Goal: Will report anxiety at manageable levels  Description: INTERVENTIONS:  1. Administer medication as ordered  2. Teach and rehearse alternative coping skills  3. Provide emotional support with 1:1 interaction with staff  7/26/2023 2235 by Zeina Buckner RN  Outcome: Progressing  7/26/2023 1143 by Saloni Avery RN  Outcome: Progressing  Flowsheets (Taken 7/26/2023 1137)  Will report anxiety at manageable levels:   Administer medication as ordered   Provide emotional support with 1:1 interaction with staff   Teach and rehearse alternative coping skills     Problem: Depression/Self Harm  Goal: Effect of psychiatric condition will be minimized and patient will be protected from self harm  Description: INTERVENTIONS:  1. Assess impact of patient's symptoms on level of functioning, self care needs and offer support as indicated  2. Assess patient/family knowledge of depression, impact on illness and need for teaching  3. Provide emotional support, presence and reassurance  4. Assess for possible suicidal thoughts or ideation. If patient expresses suicidal thoughts or statements do not leave alone, initiate Suicide Precautions, move to a room close to the nursing station and obtain sitter  5.  Initiate consults as appropriate with Mental Health Professional, Spiritual Care, Psychosocial CNS, and consider a recommendation to the [facility] for a Psychiatric Consultation  7/26/2023 2235 by Zeina Buckner RN  Outcome: Progressing  7/26/2023 1143 by Saloni Avery RN  Outcome: Progressing  Flowsheets  Taken 7/26/2023 1143  Effect

## 2023-07-27 NOTE — GROUP NOTE
Group Therapy Note    Date: 7/27/2023    Group Start Time: 1000  Group End Time: 1100  Group Topic: Recreational    [facility]    Meka Quan        Group Therapy Note    Attendees: 15       Patient's Goal:  To attend group    Notes:  Pt was attentive    Status After Intervention:  Unchanged    Participation Level: Interactive    Participation Quality: Appropriate      Speech:  normal      Thought Process/Content: Logical      Affective Functioning: Congruent      Mood: euthymic      Level of consciousness:  Alert      Response to Learning: Able to verbalize current knowledge/experience      Endings: None Reported    Modes of Intervention: Activity      Discipline Responsible: Behavorial Health Tech      Signature:   Hannah Jean

## 2023-07-27 NOTE — PLAN OF CARE
Patient is on the unit, social with select peers. She rates her anxiety 1 and depression 1 on a 10 point scale where 10 is the most.  Patient denies SI, HI, and hallucinations. She endorses good sleep and good appetite. Patient is trying to decide on how she can keep her mind busy while in the [employer] was recommended. Yanique has not yet spoken to her dad to tell him how his fighting with his  makes her feel. She reports no knee pain today. She states she walks on her left tip toes because her left leg is shorter than the right leg. Patient states she is looking forward to discharge on Monday. She states she did not have a nightmare last night and the minipress must be helping. She denies needs at this time. Will continue to monitor. Problem: Risk for Elopement  Goal: Patient will not exit the unit/facility without proper excort  Outcome: Progressing  Flowsheets (Taken 7/27/2023 1341)  Nursing Interventions for Elopement Risk:   Reduce environmental triggers   Communicate to physician the risk for elopement     Problem: Anxiety  Goal: Will report anxiety at manageable levels  Description: INTERVENTIONS:  1. Administer medication as ordered  2. Teach and rehearse alternative coping skills  3. Provide emotional support with 1:1 interaction with staff  Outcome: Progressing  Flowsheets (Taken 7/27/2023 1341)  Will report anxiety at manageable levels:   Administer medication as ordered   Provide emotional support with 1:1 interaction with staff   Teach and rehearse alternative coping skills     Problem: Depression/Self Harm  Goal: Effect of psychiatric condition will be minimized and patient will be protected from self harm  Description: INTERVENTIONS:  1. Assess impact of patient's symptoms on level of functioning, self care needs and offer support as indicated  2. Assess patient/family knowledge of depression, impact on illness and need for teaching  3.  Provide emotional support,

## 2023-07-28 PROCEDURE — 99232 SBSQ HOSP IP/OBS MODERATE 35: CPT | Performed by: PSYCHIATRY & NEUROLOGY

## 2023-07-28 PROCEDURE — 90833 PSYTX W PT W E/M 30 MIN: CPT | Performed by: PSYCHIATRY & NEUROLOGY

## 2023-07-28 PROCEDURE — 6370000000 HC RX 637 (ALT 250 FOR IP): Performed by: NURSE PRACTITIONER

## 2023-07-28 PROCEDURE — 1240000000 HC EMOTIONAL WELLNESS R&B

## 2023-07-28 PROCEDURE — 6370000000 HC RX 637 (ALT 250 FOR IP): Performed by: PSYCHIATRY & NEUROLOGY

## 2023-07-28 RX ORDER — LISINOPRIL 2.5 MG/1
2.5 TABLET ORAL DAILY
Status: DISCONTINUED | OUTPATIENT
Start: 2023-07-28 | End: 2023-07-31 | Stop reason: HOSPADM

## 2023-07-28 RX ADMIN — CARIPRAZINE 1.5 MG: 1.5 CAPSULE, GELATIN COATED ORAL at 08:53

## 2023-07-28 RX ADMIN — IBUPROFEN 400 MG: 400 TABLET, FILM COATED ORAL at 09:48

## 2023-07-28 RX ADMIN — PRAZOSIN HYDROCHLORIDE 1 MG: 1 CAPSULE ORAL at 20:45

## 2023-07-28 RX ADMIN — VENLAFAXINE HYDROCHLORIDE 37.5 MG: 37.5 CAPSULE, EXTENDED RELEASE ORAL at 08:53

## 2023-07-28 RX ADMIN — ATORVASTATIN CALCIUM 10 MG: 10 TABLET, FILM COATED ORAL at 20:45

## 2023-07-28 RX ADMIN — LISINOPRIL 2.5 MG: 2.5 TABLET ORAL at 11:59

## 2023-07-28 ASSESSMENT — PAIN SCALES - GENERAL: PAINLEVEL_OUTOF10: 5

## 2023-07-28 ASSESSMENT — PAIN DESCRIPTION - DESCRIPTORS: DESCRIPTORS: ACHING

## 2023-07-28 ASSESSMENT — PAIN DESCRIPTION - LOCATION: LOCATION: HEAD

## 2023-07-28 NOTE — GROUP NOTE
Group Therapy Note    Date: 7/28/2023    Group Start Time: 1000  Group End Time: 1100  Group Topic: Psychoeducation    [facility]    Nandini Hernandez, CTRS        Group Therapy Note    Attendees: 15       Patient's Goal:  \"to be more confident\"    Notes:  Pt. attended the 1000 skill group. Relaxed and calm. Worked on project with interest. Feels better. Status After Intervention:  Improved    Participation Level:  Active Listener and Interactive    Participation Quality: Appropriate, Attentive, and Sharing      Speech:  normal      Thought Process/Content: Logical      Affective Functioning: Congruent      Mood:  calm      Level of consciousness:  Alert, Oriented x4, and Attentive      Response to Learning: Progressing to goal      Endings: None Reported    Modes of Intervention: Education, Support, Socialization, and Activity      Discipline Responsible: Psychoeducational Specialist      Signature:  Flavio Narvaez

## 2023-07-28 NOTE — PLAN OF CARE
Problem: Risk for Elopement  Goal: Patient will not exit the unit/facility without proper excort  7/27/2023 2242 by Cayden Rojas RN  Outcome: Progressing  7/27/2023 1345 by Jolie Hamman, RN  Outcome: Progressing  Flowsheets (Taken 7/27/2023 1341)  Nursing Interventions for Elopement Risk:   Reduce environmental triggers   Communicate to physician the risk for elopement     Problem: Anxiety  Goal: Will report anxiety at manageable levels  Description: INTERVENTIONS:  1. Administer medication as ordered  2. Teach and rehearse alternative coping skills  3. Provide emotional support with 1:1 interaction with staff  7/27/2023 2242 by Cayden Rojas RN  Outcome: Progressing  7/27/2023 1345 by Jolie Hamman, RN  Outcome: Progressing  Flowsheets (Taken 7/27/2023 1341)  Will report anxiety at manageable levels:   Administer medication as ordered   Provide emotional support with 1:1 interaction with staff   Teach and rehearse alternative coping skills     Problem: Depression/Self Harm  Goal: Effect of psychiatric condition will be minimized and patient will be protected from self harm  Description: INTERVENTIONS:  1. Assess impact of patient's symptoms on level of functioning, self care needs and offer support as indicated  2. Assess patient/family knowledge of depression, impact on illness and need for teaching  3. Provide emotional support, presence and reassurance  4. Assess for possible suicidal thoughts or ideation. If patient expresses suicidal thoughts or statements do not leave alone, initiate Suicide Precautions, move to a room close to the nursing station and obtain sitter  5.  Initiate consults as appropriate with Mental Health Professional, Spiritual Care, Psychosocial CNS, and consider a recommendation to the [facility]IP for a Psychiatric Consultation  7/27/2023 2242 by Cayden Rojas RN  Outcome: Progressing  7/27/2023 1345 by Jolie Hamman, RN  Outcome: Progressing  Flowsheets  Taken 7/27/2023 1345  Effect

## 2023-07-28 NOTE — PLAN OF CARE
Pt out on the unit and social with peers. States her anxiety and depression are \"good. \" PT reports not sleeping well last night but reports that the blood pressure medication she took made her very tired. Pt is pleasant and cooperative. Attending groups. Denies SI, HI, AVH. Problem: Risk for Elopement  Goal: Patient will not exit the unit/facility without proper excort  7/28/2023 0815 by Candi Lagos RN  Outcome: Progressing  7/27/2023 2242 by Anne Victoria RN  Outcome: Progressing     Problem: Anxiety  Goal: Will report anxiety at manageable levels  Description: INTERVENTIONS:  1. Administer medication as ordered  2. Teach and rehearse alternative coping skills  3. Provide emotional support with 1:1 interaction with staff  7/28/2023 0815 by Candi Lagos RN  Outcome: Progressing  7/27/2023 2242 by nAne Victoria RN  Outcome: Progressing     Problem: Depression/Self Harm  Goal: Effect of psychiatric condition will be minimized and patient will be protected from self harm  Description: INTERVENTIONS:  1. Assess impact of patient's symptoms on level of functioning, self care needs and offer support as indicated  2. Assess patient/family knowledge of depression, impact on illness and need for teaching  3. Provide emotional support, presence and reassurance  4. Assess for possible suicidal thoughts or ideation. If patient expresses suicidal thoughts or statements do not leave alone, initiate Suicide Precautions, move to a room close to the nursing station and obtain sitter  5.  Initiate consults as appropriate with Mental Health Professional, Spiritual Care, Psychosocial CNS, and consider a recommendation to the [facility] for a Psychiatric Consultation  7/28/2023 0815 by Candi Lagos RN  Outcome: Progressing  7/27/2023 2242 by Anne Victoria RN  Outcome: Progressing

## 2023-07-28 NOTE — FLOWSHEET NOTE
Pt has been visible out on the unit and stays to her self. She did attend the  Psychoeducation group and her goal was \" To be more confident\". She has a flat affect and does feel her mood is improving. Pt endorses adequate sleep  and appetite. She has been been medication compliant and denies SI,HI,AVH.

## 2023-07-28 NOTE — PROGRESS NOTES
Morning Community Meeting Topics    Maki Shankar attended the morning community meeting on 7/28/23. Topics discussed today     [x] Introduction  Day of the week and date  Mask distribution  Current mask requirements  [x]Teams  Explanation of  Green and Blue team criteria  Nurses assigned to each team for today  Explanation about green and blue paper  Date  Patient's Name  Patient's Nurse  Goals  [x] Visitation  Announce the visiting hours for the day  Announce which team is allowed to have visitors for the day  Review any updated Covid 19 requirements for visitors during visitation  Vaccine Card or negative Covid test within 48 hours of visit  State Identification  Patients are reminded to alert the  at least 1 hour before visitation   [x] Unit Orientation  Coffee use  Phone location and etiquette  Shower locations  Franco and dryer location and process  Common area expectations  Staff rounds expectation  [x] Meals   Educate patient to the menu  The patient is encouraged to fill out the menu to get preferences at mealtime  The patient is educated that if they do not fill out the menu, they will get the standard tray  The coffee pot is decaf, patient encouraged to order regular coffee from menu.   Educate patient to the meal process  Patient encouraged to eat snacks provided twice daily  Snacks may stay in patient room     [x] Discharge Process  Discharge expectations  Fill out the survey after discharge   [x] Hygiene  Daily showers encouraged  Showers availability discussed   Daily dressing encouraged  Discussed wearing street clothing  Education provided on where to place linens and clothing  Linens in the hamper  personal clothing does not go into the linen hamper  [x] Group   Patient encouraged to attend group provided  Time of Group Meetings discussed  Gentle reminder that attendance is a Physician order  [x] Movement  Chair exercises completed  Stretching completed  Notes:  GOAL : \" to be more

## 2023-07-28 NOTE — PROGRESS NOTES
[address] [place] FOLLOW-UP NOTE       7/28/2023     Patient was seen and examined in person, Chart reviewed   Patient's case discussed with staff/team    Chief Complaint: depression SI    Interim History:     Depression and anxiety improved, remains flat  Ruminating thoughts decreased  Improving energy   Denies SI HI AVH; no delusion  Adequate sleep and appetite  Denies nightmares last night   Concentration and focus improving    Med compliant, denies side effects    Appetite:   [x] Normal/Unchanged  [] Increased  [] Decreased      Sleep:       [x] Normal/Unchanged  [] Fair       [] Poor              Energy:    [x] Normal/Unchanged  [] Increased  [] Decreased        SI [] Present  [x] Absent    HI  []Present  [x] Absent     Aggression:  [] yes  [x] no    Patient is [x] able  [] unable to CONTRACT FOR SAFETY     PAST MEDICAL/PSYCHIATRIC HISTORY:   Past Medical History:   Diagnosis Date    PCOS (polycystic ovarian syndrome)        FAMILY/SOCIAL HISTORY:  History reviewed. No pertinent family history. Social History     Socioeconomic History    Marital status: Single     Spouse name: Not on file    Number of children: Not on file    Years of education: Not on file    Highest education level: Not on file   Occupational History    Not on file   Tobacco Use    Smoking status: Never    Smokeless tobacco: Current   Substance and Sexual Activity    Alcohol use: Yes     Comment: socially    Drug use: Never    Sexual activity: Not on file   Other Topics Concern    Not on file   Social History Narrative    Not on file     Social Determinants of Health     Financial Resource Strain: Not on file   Food Insecurity: Not on file   Transportation Needs: Not on file   Physical Activity: Not on file   Stress: Not on file   Social Connections: Not on file   Intimate Partner Violence: Not on file   Housing Stability: Not on file           ROS:  [x] All negative/unchanged except if checked.  Explain

## 2023-07-28 NOTE — GROUP NOTE
Group Therapy Note    Date: 7/27/2023    Group Start Time: 2100  Group End Time: 2130  Group Topic: Wrap-Up    [facility]    Olga Moya RN        Group Therapy Note    Attendees: 14/25       Patient's Goal:  \" To socialize more. \"    Notes:  Goal met, pt reports talking to roommate for the first time in 4 days. Status After Intervention:  Improved    Participation Level:  Active Listener    Participation Quality: Appropriate      Speech:  normal      Thought Process/Content: Logical      Affective Functioning: Congruent      Mood: euthymic      Level of consciousness:  Alert      Response to Learning: Able to verbalize current knowledge/experience      Endings: None Reported    Modes of Intervention: Support      Discipline Responsible: Registered Nurse      Signature:  Olga Moya RN

## 2023-07-29 PROCEDURE — 6370000000 HC RX 637 (ALT 250 FOR IP): Performed by: NURSE PRACTITIONER

## 2023-07-29 PROCEDURE — 6370000000 HC RX 637 (ALT 250 FOR IP): Performed by: PSYCHIATRY & NEUROLOGY

## 2023-07-29 PROCEDURE — 1240000000 HC EMOTIONAL WELLNESS R&B

## 2023-07-29 RX ADMIN — IBUPROFEN 400 MG: 400 TABLET, FILM COATED ORAL at 11:15

## 2023-07-29 RX ADMIN — ATORVASTATIN CALCIUM 10 MG: 10 TABLET, FILM COATED ORAL at 21:19

## 2023-07-29 RX ADMIN — PRAZOSIN HYDROCHLORIDE 1 MG: 1 CAPSULE ORAL at 21:19

## 2023-07-29 RX ADMIN — LISINOPRIL 2.5 MG: 2.5 TABLET ORAL at 09:11

## 2023-07-29 RX ADMIN — VENLAFAXINE HYDROCHLORIDE 37.5 MG: 37.5 CAPSULE, EXTENDED RELEASE ORAL at 09:12

## 2023-07-29 RX ADMIN — CARIPRAZINE 1.5 MG: 1.5 CAPSULE, GELATIN COATED ORAL at 09:12

## 2023-07-29 ASSESSMENT — PAIN DESCRIPTION - DESCRIPTORS: DESCRIPTORS: ACHING

## 2023-07-29 ASSESSMENT — PAIN DESCRIPTION - LOCATION: LOCATION: BACK

## 2023-07-29 NOTE — FLOWSHEET NOTE
Pt has been visible out on the unit coloring and is isolative to self on the unit . She states \" there are a lot of people here and some are so loud\". She endorses fair sleep and adequate appetite. She feels her mood is improving and has been calm and cooperative and pleasant. She did attend morning group and her goal was \"to finish my book\". She denies suicidal or homicidal ideation. She denies delusions or AVH.

## 2023-07-29 NOTE — PROGRESS NOTES
[address] [place] FOLLOW-UP NOTE       7/29/2023     Patient was seen and examined in person, Chart reviewed   Patient's case discussed with staff/team    Chief Complaint: \"My mood is getting better\"     Interim History:     Depression and anxiety improved, remains blunted, brightens with conversation. Linear and makes good eye contact. Sleep is improving, feels rested. No overt or covert psychosis or paranoia. Happy with the medications. Improving energy   Denies SI HI AVH; no delusion    Concentration and focus improving    Med compliant, denies side effects    Appetite:   [x] Normal/Unchanged  [] Increased  [] Decreased      Sleep:       [x] Normal/Unchanged  [] Fair       [] Poor              Energy:    [x] Normal/Unchanged  [] Increased  [] Decreased        SI [] Present  [x] Absent    HI  []Present  [x] Absent     Aggression:  [] yes  [x] no    Patient is [x] able  [] unable to CONTRACT FOR SAFETY     PAST MEDICAL/PSYCHIATRIC HISTORY:   Past Medical History:   Diagnosis Date    PCOS (polycystic ovarian syndrome)        FAMILY/SOCIAL HISTORY:  History reviewed. No pertinent family history.   Social History     Socioeconomic History    Marital status: Single     Spouse name: Not on file    Number of children: Not on file    Years of education: Not on file    Highest education level: Not on file   Occupational History    Not on file   Tobacco Use    Smoking status: Never    Smokeless tobacco: Current   Substance and Sexual Activity    Alcohol use: Yes     Comment: socially    Drug use: Never    Sexual activity: Not on file   Other Topics Concern    Not on file   Social History Narrative    Not on file     Social Determinants of Health     Financial Resource Strain: Not on file   Food Insecurity: Not on file   Transportation Needs: Not on file   Physical Activity: Not on file   Stress: Not on file   Social Connections: Not on file   Intimate Partner Violence: Not on file TSH 5.020 07/23/2023 03:22 PM     No results found for: LITHIUM  No results found for: VALPROATE, CBMZ    RISK ASSESSMENT: suicide high    Treatment Plan:  Reviewed current Medications with the patient. Monitor efficacy and tolerability of current med regime   Risks, benefits, side effects, drug-to-drug interactions and alternatives to treatment were discussed. Collateral information: see social work notes  CD evaluation denies   Encourage patient to attend group and other milieu activities.   Discharge planning discussed with the patient and treatment team; tentative DC Monday pending stability    PSYCHOTHERAPY/COUNSELING:  [x] Therapeutic interview  [x] Supportive  [] CBT  [] Ongoing  [] Other  Patient was seen 1:1 for 20 minutes, other than E&M time spent, focusing on      - coping skills techniques     - Anxiety management techniques discussed including deep breathing exercise and PMR     - discussing patients strength and weakness      \    [x] Patient continues to need, on a daily basis, active treatment furnished directly by or requiring the supervision of inpatient psychiatric personnel      Anticipated Length of stay:3 days        COSIGN : yes    Electronically signed by NICK Carcamo CNP on 7/29/2023 at 9:17 AM

## 2023-07-29 NOTE — GROUP NOTE
Group Therapy Note    Date: 7/28/2023    Group Start Time: 1930  Group End Time: 2030  Group Topic: Recreational    [facility]    Cassandra Thibodeaux RN        Group Therapy Note    Attendees: 8/24       Patient's Goal:  To attend group     Notes:  Recreational Group    Status After Intervention:  Improved    Participation Level: Interactive    Participation Quality: Appropriate      Speech:  normal      Thought Process/Content: Logical      Affective Functioning: Congruent      Mood: euthymic      Level of consciousness:  Alert and Oriented x4      Response to Learning: Progressing to goal      Endings: None Reported    Modes of Intervention: Education      Discipline Responsible: Registered Nurse      Signature:  Adrian Salguero RN

## 2023-07-29 NOTE — PLAN OF CARE
Pt remains isolative to room and self majority if the shift thus far. Flat/sad affect with fair eye contact. Friendly and cooperative with staff and peers. Believes medication is helping. Feels her mood is improving overall. Further reports depression and anxiety are improving. Denies any SI, HI or AVH. Reports sleep as fair r/t sleep apnea. Does not have a CPAP at home but plans to follow up to obtain one. Plans to return back home to parents. Report her dad and best friend are her main support. Will continue to work with [facility] and with her therapy groups. Encouraged to attend groups and socialize with peers. Problem: Anxiety  Goal: Will report anxiety at manageable levels  Description: INTERVENTIONS:  1. Administer medication as ordered  2. Teach and rehearse alternative coping skills  3. Provide emotional support with 1:1 interaction with staff  Outcome: Progressing  Flowsheets  Taken 7/29/2023 1614  Will report anxiety at manageable levels:   Administer medication as ordered   Provide emotional support with 1:1 interaction with staff   Teach and rehearse alternative coping skills  Taken 7/29/2023 1604  Will report anxiety at manageable levels: Provide emotional support with 1:1 interaction with staff     Problem: Depression/Self Harm  Goal: Effect of psychiatric condition will be minimized and patient will be protected from self harm  Description: INTERVENTIONS:  1. Assess impact of patient's symptoms on level of functioning, self care needs and offer support as indicated  2. Assess patient/family knowledge of depression, impact on illness and need for teaching  3. Provide emotional support, presence and reassurance  4. Assess for possible suicidal thoughts or ideation. If patient expresses suicidal thoughts or statements do not leave alone, initiate Suicide Precautions, move to a room close to the nursing station and obtain sitter  5.  Initiate consults as appropriate with Mental Health Professional, Spiritual

## 2023-07-29 NOTE — PROGRESS NOTES
Pt. declined to attend the 0900 community meeting, despite staff encouragement.   GOAL : \" to finish my book\" Electronically signed by Castillo Marie on 7/29/2023 at 12:59 PM

## 2023-07-29 NOTE — GROUP NOTE
Group Therapy Note    Date: 7/29/2023    Group Start Time: 1000  Group End Time: 1100  Group Topic: Psychoeducation    [facility]        Group Therapy Note    Attendees: 16       Patient's Goal:  \"to finish my book\"    Notes:  Pt. attended the 1000 skill group. Calm. Worked on project with interest. Talkative with other pts. Status After Intervention:  Improved    Participation Level:  Active Listener and Interactive    Participation Quality: Appropriate, Attentive, and Sharing      Speech:  normal      Thought Process/Content: Logical      Affective Functioning: Congruent      Mood:  calm      Level of consciousness:  Alert, Oriented x4, and Attentive      Response to Learning: Progressing to goal      Endings: None Reported    Modes of Intervention: Education, Support, Socialization, and Activity      Discipline Responsible: Psychoeducational Specialist      Signature:  Jaret Bialey

## 2023-07-29 NOTE — GROUP NOTE
Group Therapy Note    Date: 7/28/2023    Group Start Time: 2130  Group End Time: 2145  Group Topic: Wrap-Up    [facility]    Cassandra Thibodeaux RN        Group Therapy Note    Attendees: 24/24       Patient's Goal:  To be more confident     Notes:  goal met today     Status After Intervention:  Improved    Participation Level:  Active Listener    Participation Quality: Appropriate      Speech:  normal      Thought Process/Content: Logical      Affective Functioning: Congruent      Mood: euthymic      Level of consciousness:  Alert and Oriented x4      Response to Learning: Progressing to goal      Endings: None Reported    Modes of Intervention: Education      Discipline Responsible: Registered Nurse      Signature:  Tucker Fernandes RN

## 2023-07-30 VITALS
BODY MASS INDEX: 63.21 KG/M2 | HEIGHT: 57 IN | OXYGEN SATURATION: 97 % | DIASTOLIC BLOOD PRESSURE: 85 MMHG | WEIGHT: 293 LBS | SYSTOLIC BLOOD PRESSURE: 130 MMHG | RESPIRATION RATE: 18 BRPM | HEART RATE: 93 BPM | TEMPERATURE: 98 F

## 2023-07-30 PROCEDURE — 1240000000 HC EMOTIONAL WELLNESS R&B

## 2023-07-30 PROCEDURE — 6370000000 HC RX 637 (ALT 250 FOR IP): Performed by: NURSE PRACTITIONER

## 2023-07-30 PROCEDURE — 6370000000 HC RX 637 (ALT 250 FOR IP): Performed by: PSYCHIATRY & NEUROLOGY

## 2023-07-30 RX ADMIN — CARIPRAZINE 1.5 MG: 1.5 CAPSULE, GELATIN COATED ORAL at 08:27

## 2023-07-30 RX ADMIN — ATORVASTATIN CALCIUM 10 MG: 10 TABLET, FILM COATED ORAL at 20:59

## 2023-07-30 RX ADMIN — LISINOPRIL 2.5 MG: 2.5 TABLET ORAL at 08:27

## 2023-07-30 RX ADMIN — VENLAFAXINE HYDROCHLORIDE 37.5 MG: 37.5 CAPSULE, EXTENDED RELEASE ORAL at 08:27

## 2023-07-30 RX ADMIN — PRAZOSIN HYDROCHLORIDE 1 MG: 1 CAPSULE ORAL at 20:59

## 2023-07-30 NOTE — GROUP NOTE
Group Therapy Note    Date: 7/30/2023    Group Start Time: 1600  Group End Time: 36  Group Topic: Healthy Living/Wellness    [facility]    Isela Andrade RN        Group Therapy Note    Attendees: 11/25       Patient's Goal:  learn about self care on a spectrum    Notes:      Status After Intervention:  Unchanged    Participation Level:  Active Listener    Participation Quality: Appropriate      Speech:  normal      Thought Process/Content: Logical      Affective Functioning: Congruent      Mood: euthymic      Level of consciousness:  Alert      Response to Learning: Progressing to goal      Endings: None Reported    Modes of Intervention: Education      Discipline Responsible: Registered Nurse      Signature:  Isela Andrade RN

## 2023-07-30 NOTE — PROGRESS NOTES
[address] [place] FOLLOW-UP NOTE       7/30/2023     Patient was seen and examined in person, Chart reviewed   Patient's case discussed with staff/team    Chief Complaint: \" I am doing good\"    Interim History:     Depression and anxiety improved, remains blunted, brightens with conversation. Linear and makes good eye contact. Sleep is improving, feels rested. No overt or covert psychosis or paranoia. Patient is discharge focused. Improving energy   Denies SI HI AVH; no delusion    Concentration and focus improving    Med compliant, denies side effects    Appetite:   [x] Normal/Unchanged  [] Increased  [] Decreased      Sleep:       [x] Normal/Unchanged  [] Fair       [] Poor              Energy:    [x] Normal/Unchanged  [] Increased  [] Decreased        SI [] Present  [x] Absent    HI  []Present  [x] Absent     Aggression:  [] yes  [x] no    Patient is [x] able  [] unable to CONTRACT FOR SAFETY     PAST MEDICAL/PSYCHIATRIC HISTORY:   Past Medical History:   Diagnosis Date    PCOS (polycystic ovarian syndrome)        FAMILY/SOCIAL HISTORY:  History reviewed. No pertinent family history.   Social History     Socioeconomic History    Marital status: Single     Spouse name: Not on file    Number of children: Not on file    Years of education: Not on file    Highest education level: Not on file   Occupational History    Not on file   Tobacco Use    Smoking status: Never    Smokeless tobacco: Current   Substance and Sexual Activity    Alcohol use: Yes     Comment: socially    Drug use: Never    Sexual activity: Not on file   Other Topics Concern    Not on file   Social History Narrative    Not on file     Social Determinants of Health     Financial Resource Strain: Not on file   Food Insecurity: Not on file   Transportation Needs: Not on file   Physical Activity: Not on file   Stress: Not on file   Social Connections: Not on file   Intimate Partner Violence: Not on file   Housing

## 2023-07-30 NOTE — GROUP NOTE
Group Therapy Note    Date: 7/29/2023    Group Start Time: 2030  Group End Time: 2100  Group Topic: Group Therapy    [facility]    Steven Maddox RN        Group Therapy Note    Attendees: 13/13       Patient's Goal:  \"to finish my book\"     Notes:      Status After Intervention:  Improved    Participation Level:  Active Listener and Interactive    Participation Quality: Appropriate and Attentive      Speech:  normal      Thought Process/Content: Logical      Affective Functioning: Congruent      Mood: euthymic      Level of consciousness:  Alert and Oriented x4      Response to Learning: Able to verbalize current knowledge/experience      Endings: None Reported    Modes of Intervention: Support, Socialization, and Exploration      Discipline Responsible: Registered Nurse      Signature:  Steven Maddox RN

## 2023-07-30 NOTE — PLAN OF CARE
Problem: Risk for Elopement  Goal: Patient will not exit the unit/facility without proper excort  7/29/2023 2234 by Hailey Carter RN  Outcome: Progressing  7/29/2023 1614 by Lori Carmichael RN  Outcome: Adequate for Discharge     Problem: Anxiety  Goal: Will report anxiety at manageable levels  Description: INTERVENTIONS:  1. Administer medication as ordered  2. Teach and rehearse alternative coping skills  3. Provide emotional support with 1:1 interaction with staff  7/29/2023 2234 by Hailey Carter RN  Outcome: Progressing  7/29/2023 1614 by Lori Carmichael RN  Outcome: Progressing     Problem: Depression/Self Harm  Goal: Effect of psychiatric condition will be minimized and patient will be protected from self harm  Description: INTERVENTIONS:  1. Assess impact of patient's symptoms on level of functioning, self care needs and offer support as indicated  2. Assess patient/family knowledge of depression, impact on illness and need for teaching  3. Provide emotional support, presence and reassurance  4. Assess for possible suicidal thoughts or ideation. If patient expresses suicidal thoughts or statements do not leave alone, initiate Suicide Precautions, move to a room close to the nursing station and obtain sitter  5.  Initiate consults as appropriate with Mental Health Professional, Spiritual Care, Psychosocial CNS, and consider a recommendation to the LIP for a Psychiatric Consultation  7/29/2023 2234 by Hailey Carter RN  Outcome: Progressing  7/29/2023 1614 by Lori Carmichael RN  Outcome: Progressing

## 2023-07-30 NOTE — GROUP NOTE
Group Therapy Note    Date: 7/30/2023    Group Start Time: 1000  Group End Time: 1100  Group Topic: Psychoeducation    [facility]    Bay Epperson, CTRS        Group Therapy Note    Attendees: 14       Patient's Goal:  \"to be calm\"    Notes:  Pt. attended the 1000 skill group. Finished project and easily engaged in the group discussion. Status After Intervention:  Improved    Participation Level:  Active Listener and Interactive    Participation Quality: Appropriate, Attentive, and Sharing      Speech:  normal      Thought Process/Content: Logical      Affective Functioning: Flat      Mood:  calm      Level of consciousness:  Alert, Oriented x4, and Attentive      Response to Learning: Progressing to goal      Endings: None Reported    Modes of Intervention: Education, Support, Socialization, and Activity      Discipline Responsible: Psychoeducational Specialist      Signature:  Heidi Ramirez

## 2023-07-30 NOTE — PLAN OF CARE
Patient is alert and oriented x 4, she walks independently with a steady gait. Patient is observed out on the unit but not social with peers. She denies SI/HI/AVH. Patient rates both anxiety and depression both 2/10, she states that this is an improvement for her. When asked how she manages anxiety and depression at home patient states she uses distractions like listening music and doing puzzles. Patient reports having some back pain but declines any ibuprofen at this time. Problem: Risk for Elopement  Goal: Patient will not exit the unit/facility without proper excort  Outcome: Progressing  Flowsheets (Taken 7/30/2023 1423)  Nursing Interventions for Elopement Risk:   Reduce environmental triggers   Communicate to physician the risk for elopement     Problem: Anxiety  Goal: Will report anxiety at manageable levels  Description: INTERVENTIONS:  1. Administer medication as ordered  2. Teach and rehearse alternative coping skills  3. Provide emotional support with 1:1 interaction with staff  Outcome: Progressing  Flowsheets (Taken 7/30/2023 1423)  Will report anxiety at manageable levels:   Administer medication as ordered   Provide emotional support with 1:1 interaction with staff     Problem: Depression/Self Harm  Goal: Effect of psychiatric condition will be minimized and patient will be protected from self harm  Description: INTERVENTIONS:  1. Assess impact of patient's symptoms on level of functioning, self care needs and offer support as indicated  2. Assess patient/family knowledge of depression, impact on illness and need for teaching  3. Provide emotional support, presence and reassurance  4. Assess for possible suicidal thoughts or ideation. If patient expresses suicidal thoughts or statements do not leave alone, initiate Suicide Precautions, move to a room close to the nursing station and obtain sitter  5.  Initiate consults as appropriate with Mental Health Professional, Spiritual Care, Psychosocial CNS, and consider a recommendation to the [facility]IP for a Psychiatric Consultation  Outcome: Progressing  Flowsheets  Taken 7/30/2023 1427  Effect of psychiatric condition will be minimized and patient will be protected from self harm: Provide emotional support, presence and reassurance  Taken 7/30/2023 1423  Effect of psychiatric condition will be minimized and patient will be protected from self harm: Provide emotional support, presence and reassurance

## 2023-07-30 NOTE — FLOWSHEET NOTE
Patient is visible out on the unit coloring and social with select peers. She did attend the afternoon group and her goal was \"learn about self care on a spectrum\". She has been alert and oriented x4 . She rates her anxiety a 2/10 and depression a 3/10. Pt brightens with conversations and has a blunted affect. She has good eye contact and endorses improved sleep and appetite. She denies SI,HI,AVH.

## 2023-07-30 NOTE — PROGRESS NOTES
Pt. declined to attend the 0900 community meeting, despite staff encouragement.  GOAL : \" to be calm\" Electronically signed by Malvin Kelly on 7/30/2023 at 9:38 AM

## 2023-07-31 VITALS
HEART RATE: 97 BPM | BODY MASS INDEX: 63.21 KG/M2 | DIASTOLIC BLOOD PRESSURE: 80 MMHG | WEIGHT: 293 LBS | OXYGEN SATURATION: 96 % | SYSTOLIC BLOOD PRESSURE: 131 MMHG | HEIGHT: 57 IN | RESPIRATION RATE: 18 BRPM | TEMPERATURE: 98.1 F

## 2023-07-31 PROCEDURE — 99239 HOSP IP/OBS DSCHRG MGMT >30: CPT | Performed by: PSYCHIATRY & NEUROLOGY

## 2023-07-31 PROCEDURE — 6370000000 HC RX 637 (ALT 250 FOR IP): Performed by: PSYCHIATRY & NEUROLOGY

## 2023-07-31 PROCEDURE — 6370000000 HC RX 637 (ALT 250 FOR IP): Performed by: NURSE PRACTITIONER

## 2023-07-31 RX ORDER — LISINOPRIL 2.5 MG/1
2.5 TABLET ORAL DAILY
Qty: 30 TABLET | Refills: 1 | Status: SHIPPED | OUTPATIENT
Start: 2023-08-01

## 2023-07-31 RX ORDER — PRAZOSIN HYDROCHLORIDE 1 MG/1
1 CAPSULE ORAL NIGHTLY
Qty: 30 CAPSULE | Refills: 1 | Status: SHIPPED | OUTPATIENT
Start: 2023-07-31

## 2023-07-31 RX ORDER — ATORVASTATIN CALCIUM 10 MG/1
10 TABLET, FILM COATED ORAL NIGHTLY
Qty: 30 TABLET | Refills: 1 | Status: SHIPPED | OUTPATIENT
Start: 2023-07-31

## 2023-07-31 RX ORDER — VENLAFAXINE HYDROCHLORIDE 37.5 MG/1
37.5 CAPSULE, EXTENDED RELEASE ORAL
Qty: 15 CAPSULE | Refills: 3 | Status: SHIPPED | OUTPATIENT
Start: 2023-08-01

## 2023-07-31 RX ADMIN — CARIPRAZINE 1.5 MG: 1.5 CAPSULE, GELATIN COATED ORAL at 09:05

## 2023-07-31 RX ADMIN — VENLAFAXINE HYDROCHLORIDE 37.5 MG: 37.5 CAPSULE, EXTENDED RELEASE ORAL at 09:05

## 2023-07-31 RX ADMIN — LISINOPRIL 2.5 MG: 2.5 TABLET ORAL at 09:05

## 2023-07-31 NOTE — PROGRESS NOTES
Morning Community Meeting Topics    Mushtaq Velez attended the morning community meeting on 7/31/23. Topics discussed today     [x] Introduction  Day of the week and date  Mask distribution  Current mask requirements  [x]Teams  Explanation of  Green and Blue team criteria  Nurses assigned to each team for today  Explanation about green and blue paper  Date  Patient's Name  Patient's Nurse  Goals  [x] Visitation  Announce the visiting hours for the day  Announce which team is allowed to have visitors for the day  Review any updated Covid 19 requirements for visitors during visitation  Vaccine Card or negative Covid test within 48 hours of visit  State Identification  Patients are reminded to alert the  at least 1 hour before visitation   [x] Unit Orientation  Coffee use  Phone location and etiquette  Shower locations  Franco and dryer location and process  Common area expectations  Staff rounds expectation  [x] Meals   Educate patient to the menu  The patient is encouraged to fill out the menu to get preferences at mealtime  The patient is educated that if they do not fill out the menu, they will get the standard tray  The coffee pot is decaf, patient encouraged to order regular coffee from menu.   Educate patient to the meal process  Patient encouraged to eat snacks provided twice daily  Snacks may stay in patient room     [x] Discharge Process  Discharge expectations  Fill out the survey after discharge   [x] Hygiene  Daily showers encouraged  Showers availability discussed   Daily dressing encouraged  Discussed wearing street clothing  Education provided on where to place linens and clothing  Linens in the hamper  personal clothing does not go into the linen hamper  [x] Group   Patient encouraged to attend group provided  Time of Group Meetings discussed  Gentle reminder that attendance is a Physician order  [x] Movement  Chair exercises completed  Stretching completed  Notes:

## 2023-07-31 NOTE — DISCHARGE INSTRUCTIONS
Keep all follow up appointments, take medications as ordered, utilize positive supports, abstain from use of alcohol and drugs. If symptoms return or you feel at risk to yourself or others, please call 911, return the nearest emergency room, or call your local crisis hotline:  [facility]: [phone] [facility] [address]: [phone]: [phone]     Due to the [address] [place] [address] Smoking Cessation Group is not currently available. For assistance with quitting smoking please go to https://smokefree.gov. A prescription for an FDA-approved tobacco cessation medication was offered at discharge and the patient refused. Someone from [address] [place] [address] will be calling you tomorrow to follow up on your care. If you don't hear from us, give us a call! [phone].

## 2023-07-31 NOTE — PROGRESS NOTES
CLINICAL PHARMACY NOTE: MEDS TO BEDS    Total # of Prescriptions Filled: 4   The following medications were delivered to the patient:  Atorvastatin 10 mg tab  Lisinopril 2.5 mg tab  Prazosin 1mg cap  Venlafaxine ER 37.5 mg cap    Additional Documentation:  We didn't fill the Suma Katelyn because it was requiring a prior authorization from the insurance.

## 2023-07-31 NOTE — GROUP NOTE
Group Therapy Note    Date: 7/30/2023    Group Start Time: 2000  Group End Time: 2030  Group Topic: Wrap-Up    [facility]    Anita Mcgill RN        Group Therapy Note    Attendees: 17/25       Patient's Goal: \"be calm\"     Notes:  Pt attended wrap up group and reports meeting their goal    Status After Intervention:  Improved    Participation Level:  Active Listener    Participation Quality: Attentive      Speech:  normal      Thought Process/Content: Logical  Linear      Affective Functioning: Congruent      Mood: euthymic      Level of consciousness:  Alert, Oriented x4, and Attentive      Response to Learning: Able to verbalize current knowledge/experience      Endings: None Reported    Modes of Intervention: Support, Socialization, and Exploration      Discipline Responsible: Registered Nurse      Signature:  Anita Mcgill RN

## 2023-07-31 NOTE — PLAN OF CARE
Problem: Risk for Elopement  Goal: Patient will not exit the unit/facility without proper excort  7/30/2023 2250 by Nahomi Jaeger RN  Outcome: Progressing  7/30/2023 1433 by Brynn Caruso RN  Outcome: Progressing     Problem: Anxiety  Goal: Will report anxiety at manageable levels  Description: INTERVENTIONS:  1. Administer medication as ordered  2. Teach and rehearse alternative coping skills  3. Provide emotional support with 1:1 interaction with staff  7/30/2023 2250 by Nahomi Jaeger RN  Outcome: Progressing  7/30/2023 1433 by Brynn Caruso RN  Outcome: Progressing     Problem: Depression/Self Harm  Goal: Effect of psychiatric condition will be minimized and patient will be protected from self harm  Description: INTERVENTIONS:  1. Assess impact of patient's symptoms on level of functioning, self care needs and offer support as indicated  2. Assess patient/family knowledge of depression, impact on illness and need for teaching  3. Provide emotional support, presence and reassurance  4. Assess for possible suicidal thoughts or ideation. If patient expresses suicidal thoughts or statements do not leave alone, initiate Suicide Precautions, move to a room close to the nursing station and obtain sitter  5.  Initiate consults as appropriate with Mental Health Professional, Spiritual Care, Psychosocial CNS, and consider a recommendation to the [facility] for a Psychiatric Consultation  7/30/2023 2250 by Nahomi Jaeger RN  Outcome: Progressing  7/30/2023 1433 by Brynn Caruso RN  Outcome: Progressing

## 2023-07-31 NOTE — DISCHARGE INSTR - DIET

## 2023-07-31 NOTE — GROUP NOTE
Group Therapy Note    Date: 7/31/2023    Group Start Time: 1000  Group End Time: 1100  Group Topic: Activity    [facility]    Brandi , RN; Shelbi Amato        Group Therapy Note    Attendees: 8/25       Patient's Goal:  To attend group    Notes: Active participant, appropriate.      Status After Intervention:  Improved    Participation Level: Interactive    Participation Quality: Appropriate      Speech:  normal      Thought Process/Content: Logical      Affective Functioning: Congruent      Mood: euthymic      Level of consciousness:  Alert and Oriented x4      Response to Learning: Progressing to goal      Endings: None Reported    Modes of Intervention: Socialization and Activity      Discipline Responsible: Registered Nurse      Signature:  Asha Wisdom RN

## 2023-07-31 NOTE — DISCHARGE SUMMARY
Suicidal Ideation:  denies suicidal ideation  Cognition:  oriented to person, place, and time   Concentration intact  Memory intact  Insight good   Judgement fair   Fund of Knowledge adequate      ASSESSMENT:  Patient symptoms are:  [x] Well controlled  [x] Improving  [] Worsening  [] No change      Diagnosis:  Principal Problem:    Major depression, recurrent ([address])  Resolved Problems:    * No resolved hospital problems. *      LABS:    No results for input(s): WBC, HGB, PLT in the last 72 hours. No results for input(s): NA, K, CL, CO2, BUN, CREATININE, GLUCOSE in the last 72 hours. No results for input(s): BILITOT, ALKPHOS, AST, ALT in the last 72 hours. Lab Results   Component Value Date/Time    LABAMPH Neg 07/23/2023 03:15 PM    BARBSCNU Neg 07/23/2023 03:15 PM    LABBENZ Neg 07/23/2023 03:15 PM    LABMETH Neg 07/23/2023 03:15 PM    OPIATESCREENURINE Neg 07/23/2023 03:15 PM    PHENCYCLIDINESCREENURINE Neg 07/23/2023 03:15 PM    ETOH <10 07/23/2023 03:22 PM     Lab Results   Component Value Date/Time    TSH 5.020 07/23/2023 03:22 PM     No results found for: LITHIUM  No results found for: VALPROATE, CBMZ    RISK ASSESSMENT AT DISCHARGE: Low risk for suicide and homicide. Treatment Plan:  Reviewed current Medications with the patient. Education provided on the complaince with treatment. Risks, benefits, side effects, drug-to-drug interactions and alternatives to treatment were discussed. Encourage patient to attend outpatient follow up appointment and therapy. Patient was advised to call the outpatient provider, visit the nearest ED or call 911 if symptoms are not manageable. Patient's family member was contacted prior to the discharge.          Medication List        START taking these medications      cariprazine hcl 1.5 MG capsule  Commonly known as: VRAYLAR  Take 1 capsule by mouth daily  Start taking on: August 1, 2023     lisinopril 2.5 MG tablet  Commonly known as: PRINIVIL;ZESTRIL  Take 1

## 2023-10-10 ENCOUNTER — HOSPITAL ENCOUNTER (EMERGENCY)
Age: 28
Discharge: HOME | End: 2023-10-10
Payer: COMMERCIAL

## 2023-10-10 VITALS
OXYGEN SATURATION: 95 % | HEIGHT: 57 IN | TEMPERATURE: 97.4 F | SYSTOLIC BLOOD PRESSURE: 128 MMHG | DIASTOLIC BLOOD PRESSURE: 85 MMHG | WEIGHT: 293 LBS | HEART RATE: 90 BPM | RESPIRATION RATE: 18 BRPM | BODY MASS INDEX: 63.21 KG/M2

## 2023-10-10 DIAGNOSIS — S39.012A BACK STRAIN, INITIAL ENCOUNTER: Primary | ICD-10-CM

## 2023-10-10 PROCEDURE — 99283 EMERGENCY DEPT VISIT LOW MDM: CPT

## 2023-10-10 RX ORDER — METHOCARBAMOL 750 MG/1
750 TABLET, FILM COATED ORAL 4 TIMES DAILY
Qty: 20 TABLET | Refills: 0 | Status: SHIPPED | OUTPATIENT
Start: 2023-10-10 | End: 2023-10-10 | Stop reason: SDUPTHER

## 2023-10-10 RX ORDER — NAPROXEN 500 MG/1
500 TABLET ORAL 2 TIMES DAILY
Qty: 20 TABLET | Refills: 0 | Status: SHIPPED | OUTPATIENT
Start: 2023-10-10 | End: 2023-10-10 | Stop reason: SDUPTHER

## 2023-10-10 RX ORDER — NAPROXEN 500 MG/1
500 TABLET ORAL 2 TIMES DAILY
Qty: 20 TABLET | Refills: 0 | Status: SHIPPED | OUTPATIENT
Start: 2023-10-10 | End: 2023-10-20

## 2023-10-10 RX ORDER — METHOCARBAMOL 750 MG/1
750 TABLET, FILM COATED ORAL 4 TIMES DAILY
Qty: 20 TABLET | Refills: 0 | Status: SHIPPED | OUTPATIENT
Start: 2023-10-10 | End: 2023-10-15

## 2023-10-10 ASSESSMENT — PATIENT HEALTH QUESTIONNAIRE - PHQ9
SUM OF ALL RESPONSES TO PHQ QUESTIONS 1-9: 0
SUM OF ALL RESPONSES TO PHQ QUESTIONS 1-9: 0
1. LITTLE INTEREST OR PLEASURE IN DOING THINGS: 0
SUM OF ALL RESPONSES TO PHQ QUESTIONS 1-9: 0
SUM OF ALL RESPONSES TO PHQ9 QUESTIONS 1 & 2: 0
SUM OF ALL RESPONSES TO PHQ QUESTIONS 1-9: 0
2. FEELING DOWN, DEPRESSED OR HOPELESS: 0

## 2023-10-10 ASSESSMENT — ENCOUNTER SYMPTOMS
TROUBLE SWALLOWING: 0
COUGH: 0
SHORTNESS OF BREATH: 0
BACK PAIN: 1
VOMITING: 0
NAUSEA: 0
DIARRHEA: 0
ABDOMINAL PAIN: 0
SORE THROAT: 0

## 2023-10-10 ASSESSMENT — PAIN DESCRIPTION - FREQUENCY: FREQUENCY: INTERMITTENT

## 2023-10-10 ASSESSMENT — PAIN - FUNCTIONAL ASSESSMENT: PAIN_FUNCTIONAL_ASSESSMENT: 0-10

## 2023-10-10 ASSESSMENT — PAIN DESCRIPTION - LOCATION: LOCATION: BACK

## 2023-10-10 ASSESSMENT — LIFESTYLE VARIABLES
HOW OFTEN DO YOU HAVE A DRINK CONTAINING ALCOHOL: NEVER
HOW MANY STANDARD DRINKS CONTAINING ALCOHOL DO YOU HAVE ON A TYPICAL DAY: PATIENT DOES NOT DRINK

## 2023-10-10 ASSESSMENT — PAIN DESCRIPTION - PAIN TYPE: TYPE: ACUTE PAIN

## 2023-10-10 ASSESSMENT — PAIN DESCRIPTION - ORIENTATION: ORIENTATION: LOWER

## 2023-10-10 ASSESSMENT — PAIN DESCRIPTION - ONSET: ONSET: ON-GOING

## 2023-10-10 ASSESSMENT — PAIN DESCRIPTION - DESCRIPTORS: DESCRIPTORS: ACHING

## 2023-10-10 NOTE — ED PROVIDER NOTES
[facility] ED  eMERGENCY dEPARTMENT eNCOUnter      Pt Name: Ran hCu  MRN: [id number]  [address] [facility] [place] 1995  Date of evaluation: 10/10/2023  Provider: NICK Milian CNP      HISTORY OF PRESENT ILLNESS    Ran Chu is a 32 y.o. female who presents to the Emergency Department with R sided low back pain that started 2 days ago. Patient denies any injury or trauma. Pain is moderate. No saddle anesthesia, foot drop or incontinence of bowel or bladder. She is ambulating without difficulty. REVIEW OF SYSTEMS       Review of Systems   Constitutional:  Negative for activity change, appetite change and fever. HENT:  Negative for congestion, drooling, sore throat and trouble swallowing. Respiratory:  Negative for cough and shortness of breath. Cardiovascular:  Negative for chest pain. Gastrointestinal:  Negative for abdominal pain, diarrhea, nausea and vomiting. Genitourinary:  Negative for dysuria. Musculoskeletal:  Positive for back pain (R sided). Negative for arthralgias and neck pain. Skin:  Negative for rash. Neurological:  Negative for dizziness, syncope, light-headedness and headaches. All other systems reviewed and are negative.         PAST MEDICAL HISTORY     Past Medical History:   Diagnosis Date    PCOS (polycystic ovarian syndrome)          SURGICAL HISTORY       Past Surgical History:   Procedure Laterality Date    CHOLECYSTECTOMY           CURRENT MEDICATIONS       Previous Medications    ATORVASTATIN (LIPITOR) 10 MG TABLET    Take 1 tablet by mouth nightly    CARIPRAZINE HCL (VRAYLAR) 1.5 MG CAPSULE    Take 1 capsule by mouth daily    LISINOPRIL (PRINIVIL;ZESTRIL) 2.5 MG TABLET    Take 1 tablet by mouth daily    PRAZOSIN (MINIPRESS) 1 MG CAPSULE    Take 1 capsule by mouth nightly    VENLAFAXINE (EFFEXOR XR) 37.5 MG EXTENDED RELEASE CAPSULE    Take 1 capsule by mouth daily (with breakfast)    VITAMIN D 25 MCG (1000 UT) CAPS    Take 2 capsules

## 2024-12-02 ENCOUNTER — HOSPITAL ENCOUNTER (OUTPATIENT)
Dept: PSYCHIATRY | Age: 29
Setting detail: THERAPIES SERIES
Discharge: HOME | End: 2024-12-02

## 2024-12-02 PROCEDURE — H2020 THER BEHAV SVC, PER DIEM: HCPCS

## 2024-12-02 NOTE — PROGRESS NOTES
Intensive Outpatient Program Behavioral Health  Psychotherapy Note      Diagnosis: depression    General Information    * If patient is absent, state reason for absence, staff intervention, and staff signature.    Psychotherapy Session    Start time: 0900  Stop time: 1200    Problem number: 1  Short term goal (STP): Patient to learn about handling her home issues; and manage depression and anxiety.    Patient Mental Status and Mood/Affect:Depressed and Worried    Patient Behavior and Appearance: Neatly Groomed and Needed Prompting    Intervention/Techniques: Informed, Validated/Supported, and Prompted/Cued    Focus of Session/Patient Response and Progress Towards Goal: Mind and body connection. Patient learning to apply principles of mind and body to her home issues and improve her mood.    On a scale from 1-10 (1 being no concerns through 10 being severe concerns), the patient listed their:   Depression:  7  Anger:  5  Anxiety:  6    Patient's Reported Biggest Stressor:  \"home issues\"     Safety Concerns:   Patient denies any thoughts of harm to self and denies any acute safety concerns. Remains appropriate for outpatient level of care.     Patient Engagement:   Patient asked appropriate questions, was engaged in the session, and participated fully. Patient received and processed feedback/advice from group members during the session.

## 2024-12-09 ENCOUNTER — HOSPITAL ENCOUNTER (OUTPATIENT)
Dept: PSYCHIATRY | Age: 29
Setting detail: THERAPIES SERIES
Discharge: HOME | End: 2024-12-09
Payer: MEDICAID

## 2024-12-11 ENCOUNTER — APPOINTMENT (OUTPATIENT)
Dept: PSYCHIATRY | Age: 29
End: 2024-12-11
Payer: MEDICAID

## 2024-12-12 ENCOUNTER — APPOINTMENT (OUTPATIENT)
Dept: PSYCHIATRY | Age: 29
End: 2024-12-12
Payer: MEDICAID

## 2024-12-16 ENCOUNTER — APPOINTMENT (OUTPATIENT)
Dept: PSYCHIATRY | Age: 29
End: 2024-12-16
Payer: MEDICAID

## 2024-12-18 ENCOUNTER — APPOINTMENT (OUTPATIENT)
Dept: PSYCHIATRY | Age: 29
End: 2024-12-18
Payer: MEDICAID

## 2024-12-19 ENCOUNTER — APPOINTMENT (OUTPATIENT)
Dept: PSYCHIATRY | Age: 29
End: 2024-12-19
Payer: MEDICAID

## 2024-12-23 ENCOUNTER — APPOINTMENT (OUTPATIENT)
Dept: PSYCHIATRY | Age: 29
End: 2024-12-23
Payer: MEDICAID

## 2024-12-26 ENCOUNTER — APPOINTMENT (OUTPATIENT)
Dept: PSYCHIATRY | Age: 29
End: 2024-12-26
Payer: MEDICAID

## 2024-12-30 ENCOUNTER — APPOINTMENT (OUTPATIENT)
Dept: PSYCHIATRY | Age: 29
End: 2024-12-30
Payer: MEDICAID

## 2025-06-24 NOTE — ED NOTES
Hi, Vielka! Santos is doing very well on Skyrizi. She said she missed an appointment with you because she got dates mixed up. I don't want to rush into surgery, but she is open to it if needed. Would your recommend any labwork or imaging at this time? Or continue to follow clinically? Appreciate your help! Patient in bed resting. Respirations even and unlabored. Safety precautions maintained.         Eli Wadsworth RN  07/24/23 [id number]

## 2025-07-09 ENCOUNTER — HOSPITAL ENCOUNTER (EMERGENCY)
Age: 30
Discharge: HOME | End: 2025-07-09
Payer: COMMERCIAL

## 2025-07-09 ENCOUNTER — APPOINTMENT (OUTPATIENT)
Dept: GENERAL RADIOLOGY | Age: 30
End: 2025-07-09
Payer: COMMERCIAL

## 2025-07-09 ENCOUNTER — APPOINTMENT (OUTPATIENT)
Dept: CT IMAGING | Age: 30
End: 2025-07-09
Payer: COMMERCIAL

## 2025-07-09 VITALS
SYSTOLIC BLOOD PRESSURE: 106 MMHG | TEMPERATURE: 98 F | HEART RATE: 82 BPM | RESPIRATION RATE: 18 BRPM | HEIGHT: 59 IN | DIASTOLIC BLOOD PRESSURE: 74 MMHG | BODY MASS INDEX: 59.07 KG/M2 | WEIGHT: 293 LBS | OXYGEN SATURATION: 94 %

## 2025-07-09 DIAGNOSIS — R79.89 ELEVATED D-DIMER: ICD-10-CM

## 2025-07-09 DIAGNOSIS — N30.90 CYSTITIS WITHOUT HEMATURIA: Primary | ICD-10-CM

## 2025-07-09 LAB
ANION GAP SERPL CALCULATED.3IONS-SCNC: 14 MEQ/L (ref 9–15)
BACTERIA URNS QL MICRO: NEGATIVE /HPF
BASOPHILS # BLD: 0.1 K/UL (ref 0–0.2)
BASOPHILS NFR BLD: 0.8 %
BILIRUB UR QL STRIP: NEGATIVE
BUN SERPL-MCNC: 11 MG/DL (ref 6–20)
CALCIUM SERPL-MCNC: 9.4 MG/DL (ref 8.5–9.9)
CHLORIDE SERPL-SCNC: 103 MEQ/L (ref 95–107)
CHP ED QC CHECK: NORMAL
CLARITY UR: ABNORMAL
CO2 SERPL-SCNC: 24 MEQ/L (ref 20–31)
COLOR UR: ABNORMAL
CREAT SERPL-MCNC: 0.74 MG/DL (ref 0.5–0.9)
D DIMER PPP FEU-MCNC: 0.94 MG/L FEU (ref 0–0.5)
EOSINOPHIL # BLD: 0.2 K/UL (ref 0–0.7)
EOSINOPHIL NFR BLD: 2.7 %
EPI CELLS #/AREA URNS AUTO: ABNORMAL /HPF (ref 0–5)
ERYTHROCYTE [DISTWIDTH] IN BLOOD BY AUTOMATED COUNT: 12 % (ref 11.5–14.5)
GLUCOSE SERPL-MCNC: 112 MG/DL (ref 70–99)
GLUCOSE UR STRIP-MCNC: NEGATIVE MG/DL
HCT VFR BLD AUTO: 47.3 % (ref 37–47)
HGB BLD-MCNC: 15.8 G/DL (ref 12–16)
HGB UR QL STRIP: ABNORMAL
HYALINE CASTS #/AREA URNS AUTO: ABNORMAL /HPF (ref 0–5)
KETONES UR STRIP-MCNC: ABNORMAL MG/DL
LEUKOCYTE ESTERASE UR QL STRIP: ABNORMAL
LYMPHOCYTES # BLD: 2.6 K/UL (ref 1–4.8)
LYMPHOCYTES NFR BLD: 34.4 %
MCH RBC QN AUTO: 29.8 PG (ref 27–31.3)
MCHC RBC AUTO-ENTMCNC: 33.4 % (ref 33–37)
MCV RBC AUTO: 89.1 FL (ref 79.4–94.8)
MONOCYTES # BLD: 0.7 K/UL (ref 0.2–0.8)
MONOCYTES NFR BLD: 8.8 %
NEUTROPHILS # BLD: 4 K/UL (ref 1.4–6.5)
NEUTS SEG NFR BLD: 52.8 %
NITRITE UR QL STRIP: NEGATIVE
PH UR STRIP: 5 [PH] (ref 5–9)
PLATELET # BLD AUTO: 386 K/UL (ref 130–400)
POTASSIUM SERPL-SCNC: 4.1 MEQ/L (ref 3.4–4.9)
PREGNANCY TEST URINE, POC: NEGATIVE
PROT UR STRIP-MCNC: 30 MG/DL
RBC # BLD AUTO: 5.31 M/UL (ref 4.2–5.4)
RBC #/AREA URNS HPF: ABNORMAL /HPF (ref 0–2)
SODIUM SERPL-SCNC: 141 MEQ/L (ref 135–144)
SP GR UR STRIP: 1.03 (ref 1–1.03)
TROPONIN, HIGH SENSITIVITY: 12 NG/L (ref 0–19)
TROPONIN, HIGH SENSITIVITY: <6 NG/L (ref 0–19)
URINE REFLEX TO CULTURE: YES
UROBILINOGEN UR STRIP-ACNC: 0.2 E.U./DL
WBC # BLD AUTO: 7.5 K/UL (ref 4.8–10.8)
WBC #/AREA URNS AUTO: >100 /HPF (ref 0–5)

## 2025-07-09 PROCEDURE — 71275 CT ANGIOGRAPHY CHEST: CPT

## 2025-07-09 PROCEDURE — 93005 ELECTROCARDIOGRAM TRACING: CPT

## 2025-07-09 PROCEDURE — 6360000004 HC RX CONTRAST MEDICATION

## 2025-07-09 PROCEDURE — 80048 BASIC METABOLIC PNL TOTAL CA: CPT

## 2025-07-09 PROCEDURE — 99285 EMERGENCY DEPT VISIT HI MDM: CPT

## 2025-07-09 PROCEDURE — 85379 FIBRIN DEGRADATION QUANT: CPT

## 2025-07-09 PROCEDURE — 71045 X-RAY EXAM CHEST 1 VIEW: CPT

## 2025-07-09 PROCEDURE — 85025 COMPLETE CBC W/AUTO DIFF WBC: CPT

## 2025-07-09 PROCEDURE — 81001 URINALYSIS AUTO W/SCOPE: CPT

## 2025-07-09 PROCEDURE — 6360000002 HC RX W HCPCS

## 2025-07-09 PROCEDURE — 2500000003 HC RX 250 WO HCPCS

## 2025-07-09 PROCEDURE — 96374 THER/PROPH/DIAG INJ IV PUSH: CPT

## 2025-07-09 PROCEDURE — 87086 URINE CULTURE/COLONY COUNT: CPT

## 2025-07-09 PROCEDURE — 84484 ASSAY OF TROPONIN QUANT: CPT

## 2025-07-09 RX ORDER — CEPHALEXIN 500 MG/1
500 CAPSULE ORAL 2 TIMES DAILY
Qty: 14 CAPSULE | Refills: 0 | Status: SHIPPED | OUTPATIENT
Start: 2025-07-09 | End: 2025-07-16

## 2025-07-09 RX ORDER — IOPAMIDOL 755 MG/ML
75 INJECTION, SOLUTION INTRAVASCULAR
Status: COMPLETED | OUTPATIENT
Start: 2025-07-09 | End: 2025-07-09

## 2025-07-09 RX ADMIN — IOPAMIDOL 75 ML: 755 INJECTION, SOLUTION INTRAVENOUS at 16:02

## 2025-07-09 RX ADMIN — WATER 1000 MG: 1 INJECTION INTRAMUSCULAR; INTRAVENOUS; SUBCUTANEOUS at 16:41

## 2025-07-09 ASSESSMENT — PAIN SCALES - GENERAL: PAINLEVEL_OUTOF10: 1

## 2025-07-09 ASSESSMENT — PAIN DESCRIPTION - DESCRIPTORS: DESCRIPTORS: ACHING

## 2025-07-09 ASSESSMENT — PAIN - FUNCTIONAL ASSESSMENT: PAIN_FUNCTIONAL_ASSESSMENT: 0-10

## 2025-07-09 ASSESSMENT — PAIN DESCRIPTION - LOCATION: LOCATION: CHEST

## 2025-07-09 NOTE — ED PROVIDER NOTES
mis-transcribed.)    Char Garcia MD (electronically signed)  Attending Emergency Physician       Char Garcia MD  07/10/25 [id number]

## 2025-07-09 NOTE — CARE COORDINATION
Met with pt at bedside re: no PCP listed in EMR. Pt states she follows w [facility] & does not recall providers name, she states she wants to remain with them, declined having OV set up with [facility] PCP.    Electronically signed by Vita Olson, RN, BSN on 7/9/2025 at 4:23 PM

## 2025-07-10 LAB
BACTERIA UR CULT: NORMAL
EKG ATRIAL RATE: 107 BPM
EKG DIAGNOSIS: NORMAL
EKG P AXIS: 32 DEGREES
EKG P-R INTERVAL: 128 MS
EKG Q-T INTERVAL: 346 MS
EKG QRS DURATION: 78 MS
EKG QTC CALCULATION (BAZETT): 461 MS
EKG R AXIS: 44 DEGREES
EKG T AXIS: 54 DEGREES
EKG VENTRICULAR RATE: 107 BPM

## 2025-07-10 PROCEDURE — 93010 ELECTROCARDIOGRAM REPORT: CPT | Performed by: INTERNAL MEDICINE

## 2025-07-10 ASSESSMENT — ENCOUNTER SYMPTOMS: BACK PAIN: 1
